# Patient Record
Sex: FEMALE | Race: WHITE | NOT HISPANIC OR LATINO | Employment: UNEMPLOYED | URBAN - METROPOLITAN AREA
[De-identification: names, ages, dates, MRNs, and addresses within clinical notes are randomized per-mention and may not be internally consistent; named-entity substitution may affect disease eponyms.]

---

## 2021-05-12 ENCOUNTER — PATIENT MESSAGE (OUTPATIENT)
Dept: RESEARCH | Facility: HOSPITAL | Age: 56
End: 2021-05-12

## 2024-01-31 ENCOUNTER — OFFICE VISIT (OUTPATIENT)
Dept: PLASTIC SURGERY | Facility: CLINIC | Age: 59
End: 2024-01-31
Payer: MEDICAID

## 2024-01-31 VITALS
HEIGHT: 60 IN | WEIGHT: 257.94 LBS | OXYGEN SATURATION: 97 % | SYSTOLIC BLOOD PRESSURE: 127 MMHG | DIASTOLIC BLOOD PRESSURE: 62 MMHG | BODY MASS INDEX: 50.64 KG/M2 | HEART RATE: 75 BPM

## 2024-01-31 DIAGNOSIS — Z98.82 S/P BREAST AUGMENTATION: ICD-10-CM

## 2024-01-31 DIAGNOSIS — C80.1 CANCER: ICD-10-CM

## 2024-01-31 PROCEDURE — 3074F SYST BP LT 130 MM HG: CPT | Mod: CPTII,,, | Performed by: SURGERY

## 2024-01-31 PROCEDURE — 3078F DIAST BP <80 MM HG: CPT | Mod: CPTII,,, | Performed by: SURGERY

## 2024-01-31 PROCEDURE — 3008F BODY MASS INDEX DOCD: CPT | Mod: CPTII,,, | Performed by: SURGERY

## 2024-01-31 PROCEDURE — 99204 OFFICE O/P NEW MOD 45 MIN: CPT | Mod: S$PBB,,, | Performed by: SURGERY

## 2024-01-31 PROCEDURE — 99999 PR PBB SHADOW E&M-EST. PATIENT-LVL IV: CPT | Mod: PBBFAC,,, | Performed by: SURGERY

## 2024-01-31 PROCEDURE — 1159F MED LIST DOCD IN RCRD: CPT | Mod: CPTII,,, | Performed by: SURGERY

## 2024-01-31 PROCEDURE — 99214 OFFICE O/P EST MOD 30 MIN: CPT | Mod: PBBFAC | Performed by: SURGERY
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
## 2024-02-21 DIAGNOSIS — C80.1 CANCER: ICD-10-CM

## 2024-02-21 DIAGNOSIS — Z98.82 S/P BREAST AUGMENTATION: Primary | ICD-10-CM

## 2024-02-22 ENCOUNTER — TELEPHONE (OUTPATIENT)
Dept: PREADMISSION TESTING | Facility: HOSPITAL | Age: 59
End: 2024-02-22
Payer: MEDICAID

## 2024-02-22 NOTE — ANESTHESIA PAT ROS NOTE
02/22/2024  Deanna Mcclain is a 58 y.o., female.      Pre-op Assessment    I have reviewed the NPO Status.   I have reviewed the Medications.     Review of Systems  Anesthesia Hx:   History of prior surgery of interest to airway management or planning:          Denies Family Hx of Anesthesia complications.   Personal Hx of Anesthesia complications          Slow To Awaken/Delayed Emergence (pt states happened once after a surgery years ago) and significant; extubation delayed; prolonged PACU stay          Social:  Former Smoker, No Alcohol Use       Hematology/Oncology:  Hematology Normal                       --  Cancer in past history:       Breast       chemotherapy and surgery   Oncology Comments: Pt h/o breast cancer s/p 2012 bilateral mastectomy and chemo     EENT/Dental:  EENT/Dental Normal           Cardiovascular:  Exercise tolerance: good   Hypertension, well controlled   Denies MI.  Denies CAD.       Denies Angina.          Functional Capacity good / => 4 METS                         Pulmonary:   COPD, mild    Denies Shortness of breath.  Sleep Apnea, CPAP                Hepatic/GI:     GERD, well controlled Denies Liver Disease.    Esophageal / Stomach Disorders (pt had gastric bypass in 2008 and reversal/creation in 2012 after complications)  Controlled by s/p Gastric Surgery.         Musculoskeletal:       Bone Disorders:    Osteoporosis        Neurological:  Neurology Normal Denies TIA.  Denies CVA.    Denies Seizures.                                Endocrine:  Denies Diabetes. Denies Hypothyroidism.        Morbid Obesity / BMI > 40  Psych:  Psychiatric History    Anxiety Disorder. Panic disorder                   Anesthesia Assessment: Preoperative EQUATION    Planned Procedure: Procedure(s) (LRB):  RECONSTRUCTION, BREAST IMPLANT BASED (Bilateral)  Requested Anesthesia Type:General  Surgeon:  Brad Zavala MD  Service: Plastics  Known or anticipated Date of Surgery:2/29/2024    Surgeon notes: reviewed    Electronic QUestionnaire Assessment completed via nurse interview with patient.        Triage considerations:     The patient has no apparent active cardiac condition (No unstable coronary Syndrome such as severe unstable angina or recent [<1 month] myocardial infarction, decompensated CHF, severe valvular   disease or significant arrhythmia)    Previous anesthesia records:Not available    Last PCP note:  no pcp  Subspecialty notes:  plastics    Other important co-morbidities: COPD, GERD, HTN, Morbid Obesity, DAQUAN, and osteoporosis, vit D deficiency, h/o adrenal benign neoplasm, panic disorder, breast cancer s/p bilateral mastectomy and chemo, gastric bypass 2008 and reversal/creation in 20121, h/o ADORE/BSO, delayed reconstruction after mastectomy       Tests already available:  No recent tests.             Instructions given. (See in Nurse's note)    Optimization:  Anesthesia Preop Clinic Assessment Indicated:                               --phone screen done        Plan:    Testing:  BMP, EKG, and Hematology Profile     Navigation: Tests Scheduled.             Results will be tracked by Preop Clinic.

## 2024-02-23 ENCOUNTER — TELEPHONE (OUTPATIENT)
Dept: PREADMISSION TESTING | Facility: HOSPITAL | Age: 59
End: 2024-02-23

## 2024-02-23 NOTE — TELEPHONE ENCOUNTER
----- Message from Mathew Robert RN sent at 2/23/2024  9:25 AM CST -----  Hey. This pt missed call from one of you and said she's trying to call back and having no luck. Can you call her back to schedule? Thanks, Mathew.

## 2024-02-28 ENCOUNTER — TELEPHONE (OUTPATIENT)
Dept: PLASTIC SURGERY | Facility: CLINIC | Age: 59
End: 2024-02-28
Payer: MEDICAID

## 2024-02-28 ENCOUNTER — ANESTHESIA EVENT (OUTPATIENT)
Dept: SURGERY | Facility: HOSPITAL | Age: 59
End: 2024-02-28
Payer: MEDICAID

## 2024-02-28 ENCOUNTER — HOSPITAL ENCOUNTER (OUTPATIENT)
Dept: CARDIOLOGY | Facility: CLINIC | Age: 59
Discharge: HOME | End: 2024-02-28
Payer: MEDICAID

## 2024-02-28 DIAGNOSIS — I10 HYPERTENSION, UNSPECIFIED TYPE: ICD-10-CM

## 2024-02-28 DIAGNOSIS — Z01.818 PREOP TESTING: ICD-10-CM

## 2024-02-28 LAB
OHS QRS DURATION: 90 MS
OHS QTC CALCULATION: 450 MS

## 2024-02-28 PROCEDURE — 93005 ELECTROCARDIOGRAM TRACING: CPT | Mod: PBBFAC | Performed by: INTERNAL MEDICINE

## 2024-02-28 PROCEDURE — 93010 ELECTROCARDIOGRAM REPORT: CPT | Mod: S$PBB,,, | Performed by: INTERNAL MEDICINE

## 2024-02-28 NOTE — TELEPHONE ENCOUNTER
Contacted pt to discuss procedure Thursday, Feb 29.  Explained to patient that she is the first case of the day, she should arrive on the 2nd floor, [facility] for 5:30a. Pt reminded not to eat or drink after midnight and to take a complete bath with Hibicleanse or Dial soap before arriving.     Pt voiced understanding.

## 2024-02-28 NOTE — ANESTHESIA PREPROCEDURE EVALUATION
"                                                                                                             02/28/2024  Deanna Mcclain is a 58 y.o., female w prior mastectomy 9 yrs ago, extreme obesity, HTN, DAQUAN and hx of gastric bypass    Pt reports that approximately 10 years ago she underwent emergency exlap for sbo and reports "dead stomach" was discovered at this time. She also states she had a large aspiration event during this surgery requiring icu admission and prolonged ventilator support.    Pre-op Assessment    I have reviewed the Patient Summary Reports.          Review of Systems  Anesthesia Hx:   History of prior surgery of interest to airway management or planning:             Cardiovascular:     Hypertension                                  Hypertension         Pulmonary:   COPD     Sleep Apnea    Chronic Obstructive Pulmonary Disease (COPD):           Obstructive Sleep Apnea (DAQUAN).           Hepatic/GI:        Prior sleeve          Psych:  Psychiatric History                  Physical Exam  General: Well nourished, Cooperative, Alert and Oriented    Airway:  Mallampati: II   Mouth Opening: Normal  TM Distance: Normal  Tongue: Normal  Neck ROM: Normal ROM    Dental:  Dentures, Partial Dentures    No top teeth, few bottom teeth    Anesthesia Plan  Type of Anesthesia, risks & benefits discussed:    Anesthesia Type: Gen ETT  Intra-op Monitoring Plan: Standard ASA Monitors  Post Op Pain Control Plan: multimodal analgesia  Induction:  IV  Airway Plan: Video  Informed Consent: Informed consent signed with the Patient and all parties understand the risks and agree with anesthesia plan.  All questions answered.   ASA Score: 3  Day of Surgery Review of History & Physical: H&P Update referred to the surgeon/provider.    Ready For Surgery From Anesthesia Perspective.     .      "

## 2024-02-29 ENCOUNTER — ANESTHESIA (OUTPATIENT)
Dept: SURGERY | Facility: HOSPITAL | Age: 59
End: 2024-02-29
Payer: MEDICAID

## 2024-02-29 ENCOUNTER — HOSPITAL ENCOUNTER (OUTPATIENT)
Facility: HOSPITAL | Age: 59
Discharge: HOME | End: 2024-02-29
Attending: SURGERY | Admitting: SURGERY
Payer: MEDICAID

## 2024-02-29 VITALS
HEIGHT: 60 IN | RESPIRATION RATE: 27 BRPM | HEART RATE: 58 BPM | DIASTOLIC BLOOD PRESSURE: 73 MMHG | TEMPERATURE: 98 F | OXYGEN SATURATION: 99 % | SYSTOLIC BLOOD PRESSURE: 111 MMHG | WEIGHT: 180 LBS | BODY MASS INDEX: 35.34 KG/M2

## 2024-02-29 DIAGNOSIS — Z98.82 BREAST IMPLANT IN SITU: ICD-10-CM

## 2024-02-29 DIAGNOSIS — I10 HYPERTENSION, UNSPECIFIED TYPE: ICD-10-CM

## 2024-02-29 DIAGNOSIS — Z01.818 PREOP TESTING: Primary | ICD-10-CM

## 2024-02-29 PROCEDURE — 71000016 HC POSTOP RECOV ADDL HR: Performed by: SURGERY

## 2024-02-29 PROCEDURE — 88307 TISSUE EXAM BY PATHOLOGIST: CPT | Mod: 59 | Performed by: STUDENT IN AN ORGANIZED HEALTH CARE EDUCATION/TRAINING PROGRAM

## 2024-02-29 PROCEDURE — 88305 TISSUE EXAM BY PATHOLOGIST: CPT | Mod: 59 | Performed by: STUDENT IN AN ORGANIZED HEALTH CARE EDUCATION/TRAINING PROGRAM

## 2024-02-29 PROCEDURE — 88305 TISSUE EXAM BY PATHOLOGIST: CPT | Mod: 26,,, | Performed by: STUDENT IN AN ORGANIZED HEALTH CARE EDUCATION/TRAINING PROGRAM

## 2024-02-29 PROCEDURE — 15777 ACELLULAR DERM MATRIX IMPLT: CPT | Mod: 50,,, | Performed by: SURGERY

## 2024-02-29 PROCEDURE — 71000044 HC DOSC ROUTINE RECOVERY FIRST HOUR: Performed by: SURGERY

## 2024-02-29 PROCEDURE — 36000706: Performed by: SURGERY

## 2024-02-29 PROCEDURE — 19342 INSJ/RPLCMT BRST IMPLT SEP D: CPT | Mod: 50,,, | Performed by: SURGERY

## 2024-02-29 PROCEDURE — 71000015 HC POSTOP RECOV 1ST HR: Performed by: SURGERY

## 2024-02-29 PROCEDURE — 37000009 HC ANESTHESIA EA ADD 15 MINS: Performed by: SURGERY

## 2024-02-29 PROCEDURE — 27201423 OPTIME MED/SURG SUP & DEVICES STERILE SUPPLY: Performed by: SURGERY

## 2024-02-29 PROCEDURE — 36000707: Performed by: SURGERY

## 2024-02-29 PROCEDURE — 25000003 PHARM REV CODE 250: Performed by: ANESTHESIOLOGY

## 2024-02-29 PROCEDURE — C1729 CATH, DRAINAGE: HCPCS | Performed by: SURGERY

## 2024-02-29 PROCEDURE — 63600175 PHARM REV CODE 636 W HCPCS: Performed by: ANESTHESIOLOGY

## 2024-02-29 PROCEDURE — D9220A PRA ANESTHESIA: Mod: ,,, | Performed by: ANESTHESIOLOGY

## 2024-02-29 PROCEDURE — 37000008 HC ANESTHESIA 1ST 15 MINUTES: Performed by: SURGERY

## 2024-02-29 PROCEDURE — C1789 PROSTHESIS, BREAST, IMP: HCPCS | Performed by: SURGERY

## 2024-02-29 DEVICE — IMPLANTABLE DEVICE: Type: IMPLANTABLE DEVICE | Site: BREAST | Status: FUNCTIONAL

## 2024-02-29 DEVICE — MATRIX ALLODERM THK PERF 16X20: Type: IMPLANTABLE DEVICE | Site: BREAST | Status: FUNCTIONAL

## 2024-02-29 RX ORDER — DEXMEDETOMIDINE HYDROCHLORIDE 100 UG/ML
INJECTION, SOLUTION INTRAVENOUS
Status: DISCONTINUED | OUTPATIENT
Start: 2024-02-29 | End: 2024-02-29

## 2024-02-29 RX ORDER — METHOCARBAMOL 500 MG/1
500 TABLET, FILM COATED ORAL 4 TIMES DAILY
Qty: 40 TABLET | Refills: 0 | Status: SHIPPED | OUTPATIENT
Start: 2024-02-29 | End: 2024-03-10

## 2024-02-29 RX ORDER — PROPOFOL 10 MG/ML
VIAL (ML) INTRAVENOUS
Status: DISCONTINUED | OUTPATIENT
Start: 2024-02-29 | End: 2024-02-29

## 2024-02-29 RX ORDER — OXYCODONE HYDROCHLORIDE 5 MG/1
5 TABLET ORAL EVERY 4 HOURS PRN
Qty: 18 TABLET | Refills: 0 | Status: SHIPPED | OUTPATIENT
Start: 2024-02-29

## 2024-02-29 RX ORDER — CEFAZOLIN SODIUM 1 G/3ML
INJECTION, POWDER, FOR SOLUTION INTRAMUSCULAR; INTRAVENOUS
Status: DISCONTINUED | OUTPATIENT
Start: 2024-02-29 | End: 2024-02-29

## 2024-02-29 RX ORDER — SUCCINYLCHOLINE CHLORIDE 20 MG/ML
INJECTION INTRAMUSCULAR; INTRAVENOUS
Status: DISCONTINUED | OUTPATIENT
Start: 2024-02-29 | End: 2024-02-29

## 2024-02-29 RX ORDER — MIDAZOLAM HYDROCHLORIDE 1 MG/ML
INJECTION INTRAMUSCULAR; INTRAVENOUS
Status: DISCONTINUED | OUTPATIENT
Start: 2024-02-29 | End: 2024-02-29

## 2024-02-29 RX ORDER — LIDOCAINE HYDROCHLORIDE 20 MG/ML
INJECTION INTRAVENOUS
Status: DISCONTINUED | OUTPATIENT
Start: 2024-02-29 | End: 2024-02-29

## 2024-02-29 RX ORDER — CLINDAMYCIN HYDROCHLORIDE 300 MG/1
300 CAPSULE ORAL EVERY 8 HOURS
Qty: 21 CAPSULE | Refills: 0 | Status: SHIPPED | OUTPATIENT
Start: 2024-02-29 | End: 2024-03-07

## 2024-02-29 RX ORDER — FENTANYL CITRATE 50 UG/ML
25 INJECTION, SOLUTION INTRAMUSCULAR; INTRAVENOUS EVERY 5 MIN PRN
Status: ACTIVE | OUTPATIENT
Start: 2024-02-29

## 2024-02-29 RX ORDER — OXYCODONE HYDROCHLORIDE 5 MG/1
5 TABLET ORAL
Status: DISPENSED | OUTPATIENT
Start: 2024-02-29

## 2024-02-29 RX ORDER — HALOPERIDOL 5 MG/ML
0.5 INJECTION INTRAMUSCULAR EVERY 10 MIN PRN
Status: ACTIVE | OUTPATIENT
Start: 2024-02-29

## 2024-02-29 RX ORDER — HYDROMORPHONE HYDROCHLORIDE 1 MG/ML
0.2 INJECTION, SOLUTION INTRAMUSCULAR; INTRAVENOUS; SUBCUTANEOUS EVERY 5 MIN PRN
Status: DISPENSED | OUTPATIENT
Start: 2024-02-29

## 2024-02-29 RX ORDER — FENTANYL CITRATE 50 UG/ML
INJECTION, SOLUTION INTRAMUSCULAR; INTRAVENOUS
Status: DISCONTINUED | OUTPATIENT
Start: 2024-02-29 | End: 2024-02-29

## 2024-02-29 RX ORDER — ROCURONIUM BROMIDE 10 MG/ML
INJECTION, SOLUTION INTRAVENOUS
Status: DISCONTINUED | OUTPATIENT
Start: 2024-02-29 | End: 2024-02-29

## 2024-02-29 RX ORDER — DEXAMETHASONE SODIUM PHOSPHATE 4 MG/ML
INJECTION, SOLUTION INTRA-ARTICULAR; INTRALESIONAL; INTRAMUSCULAR; INTRAVENOUS; SOFT TISSUE
Status: DISCONTINUED | OUTPATIENT
Start: 2024-02-29 | End: 2024-02-29

## 2024-02-29 RX ORDER — ONDANSETRON HYDROCHLORIDE 2 MG/ML
INJECTION, SOLUTION INTRAVENOUS
Status: DISCONTINUED | OUTPATIENT
Start: 2024-02-29 | End: 2024-02-29

## 2024-02-29 RX ORDER — PHENYLEPHRINE HYDROCHLORIDE 10 MG/ML
INJECTION INTRAVENOUS
Status: DISCONTINUED | OUTPATIENT
Start: 2024-02-29 | End: 2024-02-29

## 2024-02-29 RX ORDER — SODIUM CHLORIDE 9 MG/ML
INJECTION, SOLUTION INTRAVENOUS CONTINUOUS PRN
Status: DISCONTINUED | OUTPATIENT
Start: 2024-02-29 | End: 2024-02-29

## 2024-02-29 RX ORDER — ACETAMINOPHEN 10 MG/ML
INJECTION, SOLUTION INTRAVENOUS
Status: DISCONTINUED | OUTPATIENT
Start: 2024-02-29 | End: 2024-02-29

## 2024-02-29 RX ORDER — SODIUM CHLORIDE 9 MG/ML
INJECTION, SOLUTION INTRAVENOUS CONTINUOUS
Status: ACTIVE | OUTPATIENT
Start: 2024-02-29

## 2024-02-29 RX ADMIN — SUCCINYLCHOLINE CHLORIDE 140 MG: 20 INJECTION, SOLUTION INTRAMUSCULAR; INTRAVENOUS at 07:02

## 2024-02-29 RX ADMIN — DEXMEDETOMIDINE 8 MCG: 100 INJECTION, SOLUTION, CONCENTRATE INTRAVENOUS at 09:02

## 2024-02-29 RX ADMIN — GLYCOPYRROLATE 0.2 MG: 0.2 INJECTION, SOLUTION INTRAMUSCULAR; INTRAVENOUS at 08:02

## 2024-02-29 RX ADMIN — ROCURONIUM BROMIDE 20 MG: 10 INJECTION, SOLUTION INTRAVENOUS at 08:02

## 2024-02-29 RX ADMIN — SODIUM CHLORIDE: 0.9 INJECTION, SOLUTION INTRAVENOUS at 07:02

## 2024-02-29 RX ADMIN — PHENYLEPHRINE HYDROCHLORIDE 50 MCG: 10 INJECTION INTRAVENOUS at 07:02

## 2024-02-29 RX ADMIN — LIDOCAINE HYDROCHLORIDE 100 MG: 20 INJECTION INTRAVENOUS at 07:02

## 2024-02-29 RX ADMIN — ROCURONIUM BROMIDE 20 MG: 10 INJECTION, SOLUTION INTRAVENOUS at 09:02

## 2024-02-29 RX ADMIN — FENTANYL CITRATE 25 MCG: 50 INJECTION, SOLUTION INTRAMUSCULAR; INTRAVENOUS at 10:02

## 2024-02-29 RX ADMIN — PHENYLEPHRINE HYDROCHLORIDE 50 MCG: 10 INJECTION INTRAVENOUS at 08:02

## 2024-02-29 RX ADMIN — ROCURONIUM BROMIDE 45 MG: 10 INJECTION, SOLUTION INTRAVENOUS at 07:02

## 2024-02-29 RX ADMIN — CEFAZOLIN 2 G: 330 INJECTION, POWDER, FOR SOLUTION INTRAMUSCULAR; INTRAVENOUS at 07:02

## 2024-02-29 RX ADMIN — ROCURONIUM BROMIDE 5 MG: 10 INJECTION, SOLUTION INTRAVENOUS at 07:02

## 2024-02-29 RX ADMIN — DEXMEDETOMIDINE 12 MCG: 100 INJECTION, SOLUTION, CONCENTRATE INTRAVENOUS at 07:02

## 2024-02-29 RX ADMIN — PHENYLEPHRINE HYDROCHLORIDE 100 MCG: 10 INJECTION INTRAVENOUS at 11:02

## 2024-02-29 RX ADMIN — DEXAMETHASONE SODIUM PHOSPHATE 4 MG: 4 INJECTION, SOLUTION INTRAMUSCULAR; INTRAVENOUS at 07:02

## 2024-02-29 RX ADMIN — HYDROMORPHONE HYDROCHLORIDE 0.2 MG: 1 INJECTION, SOLUTION INTRAMUSCULAR; INTRAVENOUS; SUBCUTANEOUS at 12:02

## 2024-02-29 RX ADMIN — ROCURONIUM BROMIDE 10 MG: 10 INJECTION, SOLUTION INTRAVENOUS at 09:02

## 2024-02-29 RX ADMIN — FENTANYL CITRATE 100 MCG: 50 INJECTION, SOLUTION INTRAMUSCULAR; INTRAVENOUS at 07:02

## 2024-02-29 RX ADMIN — SUGAMMADEX 200 MG: 100 INJECTION, SOLUTION INTRAVENOUS at 11:02

## 2024-02-29 RX ADMIN — PROPOFOL 200 MG: 10 INJECTION, EMULSION INTRAVENOUS at 07:02

## 2024-02-29 RX ADMIN — OXYCODONE 5 MG: 5 TABLET ORAL at 12:02

## 2024-02-29 RX ADMIN — PHENYLEPHRINE HYDROCHLORIDE 50 MCG: 10 INJECTION INTRAVENOUS at 09:02

## 2024-02-29 RX ADMIN — MIDAZOLAM HYDROCHLORIDE 2 MG: 1 INJECTION, SOLUTION INTRAMUSCULAR; INTRAVENOUS at 07:02

## 2024-02-29 RX ADMIN — SODIUM CHLORIDE, SODIUM GLUCONATE, SODIUM ACETATE, POTASSIUM CHLORIDE, MAGNESIUM CHLORIDE, SODIUM PHOSPHATE, DIBASIC, AND POTASSIUM PHOSPHATE: .53; .5; .37; .037; .03; .012; .00082 INJECTION, SOLUTION INTRAVENOUS at 08:02

## 2024-02-29 RX ADMIN — FENTANYL CITRATE 25 MCG: 50 INJECTION, SOLUTION INTRAMUSCULAR; INTRAVENOUS at 09:02

## 2024-02-29 RX ADMIN — ACETAMINOPHEN 1000 MG: 10 INJECTION, SOLUTION INTRAVENOUS at 07:02

## 2024-02-29 RX ADMIN — FENTANYL CITRATE 50 MCG: 50 INJECTION, SOLUTION INTRAMUSCULAR; INTRAVENOUS at 11:02

## 2024-02-29 NOTE — DISCHARGE INSTRUCTIONS
Plastic Surgery Discharge Instructions    Wound Care  1. Shower daily beginning 2 days after surgery  2. Okay to let warm soapy water run over the wound at that time  3. Do not submerge wounds in the bath  4. Leave any skin glue or mesh tape in place    Drain Care  If you have drains, strip tubing and record output when drain bulb becomes half full, or at least twice daily  Record output for each drain and bring to our follow up appointment    Diet  Regular Diet    Activity  Light activity only - no lifting greater than 10 lbs  Avoid strenuous activity that would cause you to get hot or sweaty    Medications  You have been given a prescription for antibiotics, narcotic pain medication, and muscle relaxer  Unless otherwise contraindicated by your doctor, take 2 extra strength Tylenol and 600mg of ibuprofen every 8 hours  Please take medications as prescribed     What to call for:  1. Sustained fever > 101.0  2. Changes in color, sensation, temperature or pain at surgical site  3. Redness and/or drainage from the surgical site  4. Any reaction to prescribed medications  5. Continuous bloody drainage from surgical drains  6. Wound vac malfunction  7. Questions related to the procedure    Follow-up  1. Call with any questions or concerns.  2. After hours call [phone] - ask to speak with the Plastic Surgery fellow on call

## 2024-02-29 NOTE — BRIEF OP NOTE
[facility] - Surgery ([facility])  Brief Operative Note    Surgery Date: 2/29/2024     Surgeon(s) and Role:     * Osito Freire MD - Primary     * Hellen Rodgers MD    Assisting Surgeon: None    Pre-op Diagnosis:  S/P breast augmentation [Z98.82]    Post-op Diagnosis:  Post-Op Diagnosis Codes:     * S/P breast augmentation [Z98.82]    Procedure(s) (LRB):  RECONSTRUCTION, BREAST IMPLANT BASED (Bilateral)    Anesthesia: General    Operative Findings: Bilateral breast implants placed. See op note for full details.    Estimated Blood Loss: * No values recorded between 2/29/2024  8:06 AM and 2/29/2024 11:35 AM *         Specimens:   Specimen (24h ago, onward)       Start     Ordered    02/29/24 1043  Specimen to Pathology, Surgery ENT  Once        Comments: Pre-op Diagnosis: S/P breast augmentation [Z98.82]Procedure(s):RECONSTRUCTION, BREAST IMPLANT BASED Number of specimens: 2Name of specimens: 1. RIGHT AXILLIA--PERM2. LEFT AXILLA --PERM     References:    Click here for ordering Quick Tip   Question Answer Comment   Procedure Type: ENT    Which provider would you like to cc? OSITO FREIRE    Release to patient Immediate        02/29/24 1042                      Discharge Note    OUTCOME: Patient tolerated treatment/procedure well without complication and is now ready for discharge.    DISPOSITION: Home or Self Care    FINAL DIAGNOSIS:  <principal problem not specified>    FOLLOWUP: In clinic    DISCHARGE INSTRUCTIONS:    Discharge Procedure Orders   Basic Metabolic Panel   Standing Status: Future Number of Occurrences: 1 Standing Exp. Date: 04/22/25     CBC Without Differential   Standing Status: Future Number of Occurrences: 1 Standing Exp. Date: 04/22/25     Diet Adult Regular     Notify your health care provider if you experience any of the following:  temperature >100.4     Notify your health care provider if you experience any of the following:  persistent nausea and vomiting or diarrhea      Notify your health care provider if you experience any of the following:  severe uncontrolled pain     Notify your health care provider if you experience any of the following:  redness, tenderness, or signs of infection (pain, swelling, redness, odor or green/yellow discharge around incision site)     Notify your health care provider if you experience any of the following:  difficulty breathing or increased cough     Remove dressing in 48 hours     EKG 12-lead   Standing Status: Future Number of Occurrences: 1 Standing Exp. Date: 02/22/25     Weight bearing restrictions (specify):   Order Comments: Do not lift greater than 10 lbs for 4 weeks

## 2024-02-29 NOTE — ANESTHESIA PROCEDURE NOTES
Intubation    Date/Time: 2/29/2024 7:45 AM    Performed by: Keiko Gonzalez MD  Authorized by: Keiko Gonzalez MD    Intubation:     Induction:  Rapid sequence induction    Intubated:  Postinduction    Mask Ventilation:  Not attempted    Attempts:  1    Attempted By:  Staff anesthesiologist    Method of Intubation:  Video laryngoscopy    Blade:  Herring 3    Laryngeal View Grade: Grade I - full view of cords      Difficult Airway Encountered?: No      Complications:  None    Airway Device:  Oral endotracheal tube    Airway Device Size:  7.0    Style/Cuff Inflation:  Cuffed (inflated to minimal occlusive pressure)    Tube secured:  21    Secured at:  The teeth    Placement Verified By:  Capnometry    Complicating Factors:  None    Findings Post-Intubation:  BS equal bilateral and atraumatic/condition of teeth unchanged

## 2024-02-29 NOTE — ANESTHESIA POSTPROCEDURE EVALUATION
Anesthesia Post Evaluation    Patient: Deanna Mcclain    Procedure(s) Performed: Procedure(s) (LRB):  RECONSTRUCTION, BREAST IMPLANT BASED (Bilateral)    Final Anesthesia Type: general      Patient location during evaluation: PACU  Patient participation: Yes- Able to Participate  Level of consciousness: awake and alert and oriented  Post-procedure vital signs: reviewed and stable  Pain management: adequate  Airway patency: patent    PONV status at discharge: No PONV  Anesthetic complications: no      Cardiovascular status: blood pressure returned to baseline  Respiratory status: unassisted and spontaneous ventilation  Hydration status: euvolemic  Follow-up not needed.              Vitals Value Taken Time   /87 02/29/24 1302   Temp 36.8 °C (98.2 °F) 02/29/24 1145   Pulse 58 02/29/24 1302   Resp 37 02/29/24 1302   SpO2 99 % 02/29/24 1302   Vitals shown include unvalidated device data.      No case tracking events are documented in the log.      Pain/Lowell Score: Pain Rating Prior to Med Admin: 9 (2/29/2024 12:25 PM)  Lowell Score: 9 (2/29/2024 12:30 PM)

## 2024-02-29 NOTE — TRANSFER OF CARE
Anesthesia Transfer of Care Note    Patient: Deanna Mcclain    Procedure(s) Performed: Procedure(s) (LRB):  RECONSTRUCTION, BREAST IMPLANT BASED (Bilateral)    Patient location: PACU    Anesthesia Type: general    Transport from OR: Transported from OR on 2-3 L/min O2 by NC with adequate spontaneous ventilation    Post pain: adequate analgesia    Post assessment: no apparent anesthetic complications    Post vital signs: stable    Level of consciousness: awake    Nausea/Vomiting: no nausea/vomiting    Complications: none    Transfer of care protocol was followed      Last vitals: Visit Vitals  /62 (BP Location: Left arm, Patient Position: Sitting)   Pulse 67   Temp 36.3 °C (97.3 °F) (Oral)   Resp 16   Ht 5' (1.524 m)   Wt 81.6 kg (180 lb)   SpO2 97%   Breastfeeding No   BMI 35.15 kg/m²

## 2024-02-29 NOTE — H&P
Plastic and Reconstructive Surgery   H&P    Date:   2024    History of Present Illness:    58 y.o. female who presents for bilateral tissue expander vs implant placement, as well as excision of lower lip venous malformation.     She has had chemotherapy but no radiation.    There is no change in H&P since last office visit. Will proceed with planned procedure.      Past Medical History:    has a past medical history of Adrenal benign neoplasm, Breast carcinoma, COPD (chronic obstructive pulmonary disease), HTN (hypertension), benign, DAQUAN on CPAP, Osteoporosis, Panic disorder, and Vitamin D deficiency.    Past Surgical History:    has a past surgical history that includes Gastric bypass (); Gastric Bypass Reversal (); Breast surgery (Bilateral, 2012); Cholecystectomy (); Total abdominal hysterectomy w/ bilateral salpingoophorectomy (); and Hysterectomy.    Social History:  Social History     Tobacco Use    Smoking status: Former     Current packs/day: 0.00     Average packs/day: 1 pack/day for 30.0 years (30.0 ttl pk-yrs)     Types: Cigarettes     Start date: 11/3/1979     Quit date: 11/3/2009     Years since quittin.3    Smokeless tobacco: Not on file   Substance Use Topics    Alcohol use: Not Currently     Social History     Substance and Sexual Activity   Drug Use Not on file       Family History:  Family History   Problem Relation Age of Onset    Ovarian cancer Mother     Uterine cancer Mother     COPD Brother     Breast cancer Maternal Grandmother     COPD Maternal Grandfather     Breast cancer Paternal Grandmother     Lung cancer Paternal Grandfather     Breast cancer Maternal Aunt        Allergies:  Review of patient's allergies indicates:  No Known Allergies    Home Medications:  Scheduled Meds:  Continuous Infusions:   sodium chloride 0.9%       PRN Meds:.ceFAZolin (Ancef) IV (PEDS and ADULTS)      Review of Systems:  Negative except for what is noted in HPI    Physical  "Exam:  VITAL SIGNS:   Vitals:    24 0610   BP: 130/62   BP Location: Left arm   Patient Position: Sitting   Pulse: 67   Resp: 16   Temp: 97.3 °F (36.3 °C)   TempSrc: Oral   SpO2: 97%   Weight: 81.6 kg (180 lb)   Height: 5' (1.524 m)     TMAX: Temp (24hrs), Av.3 °F (36.3 °C), Min:97.3 °F (36.3 °C), Max:97.3 °F (36.3 °C)      General: Alert; No acute distress  Cardiovascular: Regular rate   Respiratory: Normal respiratory effort. Chest rise symmetric.   Abdomen: Soft, nontender, nondistended  Extremity: Moves all extremities equally.  Neurologic: No focal deficit. Speech normal     Aquicred absence of bilateral breasts  Lower lip venous malformation present    Diagnostic Data:  No results found for this or any previous visit (from the past 336 hour(s)).  Recent Results (from the past 336 hour(s))   Basic Metabolic Panel    Collection Time: 24 10:45 AM   Result Value Ref Range    Sodium 142 136 - 145 mmol/L    Potassium 4.3 3.5 - 5.1 mmol/L    Chloride 107 95 - 110 mmol/L    CO2 28 23 - 29 mmol/L    BUN 15 6 - 20 mg/dL    Creatinine 1.3 0.5 - 1.4 mg/dL    Calcium 9.5 8.7 - 10.5 mg/dL    Anion Gap 7 (L) 8 - 16 mmol/L     No results found for: "ALBUMIN"  No results found for: "CRP"  No results found for: "INR", "PROTIME"  No results found for: "PTT"    Microbiology Results (last 7 days)       ** No results found for the last 168 hours. **            Assessment:  58 y.o.female with h/o breast cancer, s/p bilateral mastectomy     Also has a lower lip venous malformation 2/2 trauma    Plan:  Plan for bilateral tissues expander vs implant placement, excision of traumatic lip venous malformation  in OR  Consent obtained    Discussed with patient and/or family the risks and benefits of surgical intervention.  Conservative measures have been exhausted and patient would like to proceed with surgery.      We have discussed risks, which include but are not limited to blood clots in the legs that can travel to the " lungs (pulmonary embolism). Pulmonary embolism can cause shortness of breath, chest pain, and even shock. Other risks include urinary tract infection, nausea and vomiting (usually related to pain medication), chronic pain, bleeding, nerve damage, blood vessel injury, scarring and infection, which can require re-operation. Furthermore, the risks of anesthesia include potential heart, lung, kidney, and liver damage.  Informed consent was obtained.  The patient understands and would like to proceed with surgery in the near future.          Will Covarrubias MD  Plastic Surgery Fellow

## 2024-03-01 NOTE — OP NOTE
Date of surgery 02/29/2024   Preoperative diagnosis history of breast cancer  Postoperative diagnosis is same  Procedure performed  1. Delayed reconstruction of bilateral breasts using implants.  2. Placement of 2 20 x 16 cm pieces of AlloDerm in the right breast  3. Placement of 220 x 16 pieces of AlloDerm left breast  Surgeon Lucas  Anesthesia general  Complications none  Blood loss 100 cc   Drains x2     The patient was placed in the supine position after adequate general anesthesia was prepped and draped in a normal sterile fashion.  The previous mastectomy incisions were then opened.  They were extended slightly medially and laterally.  The mastectomy plane was then entered above the pectoralis major muscle.  Thus the entire mastectomy pockets were reopened.  Should be noted that the contour of fat on the left breast was very symmetrical.  The contour of the breasts fat on the right side was not.  This would lead to future fat grafting in these areas.  Next, temporary sizers were then placed and was determined that a proximally 580 cc smooth silicone implant would be used.  Chest wall was then re-prepped redraped pockets were irrigated with Vashe solution.  2580 cc smooth silicone implants were opened on the back table.  Four pieces of 16 cm x 20 cm AlloDerm was opened on the back table.  Each piece of AlloDerm was soaked rinsed and soaked again.  The AlloDerm was sutured around each implant 2 pieces for each implant.  Excess was trimmed.  The AlloDerm surrounded implants were then placed in the prepectoral position and secured using interrupted 2-0 Vicryl sutures.  The left breast due to the symmetrical fat was noted to be very smooth.  The right breast had some thinned areas of the mastectomy flaps which showed some contour irregularities laterally which can be fat grafted at a later time.  Capsules were closed using running 2-0 Vicryl and the skin using interrupted 3-0 Monocryl followed by running 4-0  Monocryl subcuticular suture.  A drain was placed in each breast.

## 2024-03-06 ENCOUNTER — OFFICE VISIT (OUTPATIENT)
Dept: PLASTIC SURGERY | Facility: CLINIC | Age: 59
End: 2024-03-06
Payer: MEDICAID

## 2024-03-06 VITALS
OXYGEN SATURATION: 99 % | HEART RATE: 74 BPM | SYSTOLIC BLOOD PRESSURE: 129 MMHG | BODY MASS INDEX: 35.15 KG/M2 | HEIGHT: 60 IN | DIASTOLIC BLOOD PRESSURE: 83 MMHG

## 2024-03-06 DIAGNOSIS — Z09 SURGERY FOLLOW-UP EXAMINATION: Primary | ICD-10-CM

## 2024-03-06 LAB
FINAL PATHOLOGIC DIAGNOSIS: NORMAL
GROSS: NORMAL
Lab: NORMAL
MICROSCOPIC EXAM: NORMAL

## 2024-03-06 PROCEDURE — 99024 POSTOP FOLLOW-UP VISIT: CPT | Mod: ,,, | Performed by: SURGERY

## 2024-03-06 PROCEDURE — 3074F SYST BP LT 130 MM HG: CPT | Mod: CPTII,,, | Performed by: SURGERY

## 2024-03-06 PROCEDURE — 3079F DIAST BP 80-89 MM HG: CPT | Mod: CPTII,,, | Performed by: SURGERY

## 2024-03-06 PROCEDURE — 1159F MED LIST DOCD IN RCRD: CPT | Mod: CPTII,,, | Performed by: SURGERY

## 2024-03-06 PROCEDURE — 99214 OFFICE O/P EST MOD 30 MIN: CPT | Mod: PBBFAC | Performed by: SURGERY

## 2024-03-06 PROCEDURE — 99999 PR PBB SHADOW E&M-EST. PATIENT-LVL IV: CPT | Mod: PBBFAC,,, | Performed by: SURGERY
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
## 2024-03-07 ENCOUNTER — TELEPHONE (OUTPATIENT)
Dept: PLASTIC SURGERY | Facility: CLINIC | Age: 59
End: 2024-03-07
Payer: MEDICAID

## 2024-03-07 NOTE — TELEPHONE ENCOUNTER
"Spoke w/pt and informed that clinic days are Wednesday. Pt would like to keep as is.    ----- Message from Penny Rosado RN sent at 3/6/2024  4:26 PM CST -----  Regarding: FW: pt advice  Contact: [phone]    ----- Message -----  From: Shannan Starr  Sent: 3/6/2024   2:31 PM CST  To: [facility] Staff  Subject: pt advice                                        Name Of Caller: self     Contact Preference?:  [phone]     What is the nature of the call?: would like to know if she can change her appt to Friday the 15th due to it's a long drive and she would rather the weekend so she can stay down     Additional Notes:    "Thank you for all that you do for our patients"          "

## 2024-03-13 ENCOUNTER — OFFICE VISIT (OUTPATIENT)
Dept: PLASTIC SURGERY | Facility: CLINIC | Age: 59
End: 2024-03-13
Payer: MEDICAID

## 2024-03-13 ENCOUNTER — PATIENT MESSAGE (OUTPATIENT)
Dept: PLASTIC SURGERY | Facility: CLINIC | Age: 59
End: 2024-03-13

## 2024-03-13 VITALS
HEART RATE: 74 BPM | HEIGHT: 60 IN | BODY MASS INDEX: 35.34 KG/M2 | SYSTOLIC BLOOD PRESSURE: 130 MMHG | WEIGHT: 180 LBS | DIASTOLIC BLOOD PRESSURE: 65 MMHG

## 2024-03-13 DIAGNOSIS — Z09 SURGERY FOLLOW-UP EXAMINATION: Primary | ICD-10-CM

## 2024-03-13 PROCEDURE — 99213 OFFICE O/P EST LOW 20 MIN: CPT | Mod: PBBFAC | Performed by: SURGERY

## 2024-03-13 PROCEDURE — 3078F DIAST BP <80 MM HG: CPT | Mod: CPTII,,, | Performed by: SURGERY

## 2024-03-13 PROCEDURE — 3075F SYST BP GE 130 - 139MM HG: CPT | Mod: CPTII,,, | Performed by: SURGERY

## 2024-03-13 PROCEDURE — 99024 POSTOP FOLLOW-UP VISIT: CPT | Mod: ,,, | Performed by: SURGERY

## 2024-03-13 PROCEDURE — 99999 PR PBB SHADOW E&M-EST. PATIENT-LVL III: CPT | Mod: PBBFAC,,, | Performed by: SURGERY

## 2024-03-13 PROCEDURE — 1159F MED LIST DOCD IN RCRD: CPT | Mod: CPTII,,, | Performed by: SURGERY

## 2024-03-13 NOTE — PROGRESS NOTES
Patient presents Plastic surgery Clinic after undergoing a delayed bilateral breast reconstruction using direct implant with an AlloDerm sling.  She is done very very well.  All her incisions are well healed.  She will need to have some fat grafting in the future.  Otherwise she is doing well she will return to clinic in proximally 6 weeks.

## 2024-03-27 ENCOUNTER — OFFICE VISIT (OUTPATIENT)
Dept: PLASTIC SURGERY | Facility: CLINIC | Age: 59
End: 2024-03-27
Payer: MEDICAID

## 2024-03-27 VITALS
SYSTOLIC BLOOD PRESSURE: 135 MMHG | DIASTOLIC BLOOD PRESSURE: 80 MMHG | HEIGHT: 60 IN | OXYGEN SATURATION: 99 % | HEART RATE: 94 BPM | BODY MASS INDEX: 35.15 KG/M2

## 2024-03-27 DIAGNOSIS — Z09 SURGERY FOLLOW-UP EXAMINATION: Primary | ICD-10-CM

## 2024-03-27 PROCEDURE — 99999 PR PBB SHADOW E&M-EST. PATIENT-LVL III: CPT | Mod: PBBFAC,,, | Performed by: SURGERY

## 2024-03-27 PROCEDURE — 3079F DIAST BP 80-89 MM HG: CPT | Mod: CPTII,,, | Performed by: SURGERY

## 2024-03-27 PROCEDURE — 1159F MED LIST DOCD IN RCRD: CPT | Mod: CPTII,,, | Performed by: SURGERY

## 2024-03-27 PROCEDURE — 4010F ACE/ARB THERAPY RXD/TAKEN: CPT | Mod: CPTII,,, | Performed by: SURGERY

## 2024-03-27 PROCEDURE — 3075F SYST BP GE 130 - 139MM HG: CPT | Mod: CPTII,,, | Performed by: SURGERY

## 2024-03-27 PROCEDURE — 99213 OFFICE O/P EST LOW 20 MIN: CPT | Mod: PBBFAC | Performed by: SURGERY

## 2024-03-27 PROCEDURE — 99024 POSTOP FOLLOW-UP VISIT: CPT | Mod: ,,, | Performed by: SURGERY

## 2024-03-27 RX ORDER — CLINDAMYCIN HYDROCHLORIDE 300 MG/1
300 CAPSULE ORAL EVERY 6 HOURS
Qty: 40 CAPSULE | Refills: 0 | Status: SHIPPED | OUTPATIENT
Start: 2024-03-27 | End: 2024-04-06

## 2024-03-27 NOTE — PROGRESS NOTES
Plastic Surgery Clinic Postop Visit    Subjective:      Deanna Mcclain is a 58 y.o. year old female who presents to the Plastic Surgery Clinic on 03/27/2024 for follow up visit status post delayed bilateral breast reconstruction using direct implant 650cc Natrelle with an AlloDerm sling. She has done very well. Incisions well healed.  It was discussed in her last postoperative visit about potential fat grafting in the near future.  She is still interested in this today.  She was having a little soreness over the lateral incisions.  She has had a small amount of drainage on her right bra.   Denies fever, chills, nausea, vomiting, or other systemic signs of infection.        ROS:  Negative unless otherwise stated above in HPI    Objective:     Physical Exam:  Vitals:    03/27/24 1445   BP: 135/80   Pulse: 94       WD WN NAD  VSS  Normal resp effort  Bilateral, lateral breast incisions well healed with skin staples place, no signs infection some minor surrounding erythema  There is a pinpoint wound over the central incision, drainage serous fluid. Incision otherwise c/d/i      Assessment:       No diagnosis found.    Plan:   58 y.o. female status post delayed bilateral breast reconstruction using direct implant with an AlloDerm sling.  - Doing well but has small opening draining clear fluid  - Pt was seen and evaluated by myself and Dr. Brad Zavala.   - Antibiotics x 7 days    After verbal informed consent, patient was prepped and draped in the usual sterile fashion. A #15 blade scalpel was used to excise the wound. 2-0 Nylon sutures were placed in an interrupted fashion to reapproximate the wound edges. Sterile dressings were applied. Patient tolerated the procedure well without complications.       All questions were answered. The patient was advised to call the clinic with any questions or concerns prior to their next visit.     Will Covarrubias MD- Fellow  Plastic and Reconstructive Surgery  [phone]  [phone]

## 2024-03-28 ENCOUNTER — PATIENT MESSAGE (OUTPATIENT)
Dept: PLASTIC SURGERY | Facility: CLINIC | Age: 59
End: 2024-03-28
Payer: MEDICAID

## 2024-04-02 ENCOUNTER — OFFICE VISIT (OUTPATIENT)
Dept: PLASTIC SURGERY | Facility: CLINIC | Age: 59
End: 2024-04-02
Payer: MEDICAID

## 2024-04-02 VITALS
WEIGHT: 179.88 LBS | BODY MASS INDEX: 35.31 KG/M2 | SYSTOLIC BLOOD PRESSURE: 135 MMHG | HEIGHT: 60 IN | DIASTOLIC BLOOD PRESSURE: 80 MMHG | HEART RATE: 94 BPM

## 2024-04-02 DIAGNOSIS — Z09 SURGERY FOLLOW-UP EXAMINATION: Primary | ICD-10-CM

## 2024-04-02 PROCEDURE — 4010F ACE/ARB THERAPY RXD/TAKEN: CPT | Mod: CPTII,,, | Performed by: SURGERY

## 2024-04-02 PROCEDURE — 99213 OFFICE O/P EST LOW 20 MIN: CPT | Mod: PBBFAC | Performed by: SURGERY

## 2024-04-02 PROCEDURE — 99024 POSTOP FOLLOW-UP VISIT: CPT | Mod: ,,, | Performed by: SURGERY

## 2024-04-02 PROCEDURE — 1159F MED LIST DOCD IN RCRD: CPT | Mod: CPTII,,, | Performed by: SURGERY

## 2024-04-02 PROCEDURE — 3079F DIAST BP 80-89 MM HG: CPT | Mod: CPTII,,, | Performed by: SURGERY

## 2024-04-02 PROCEDURE — 99999 PR PBB SHADOW E&M-EST. PATIENT-LVL III: CPT | Mod: PBBFAC,,, | Performed by: SURGERY

## 2024-04-02 PROCEDURE — 3075F SYST BP GE 130 - 139MM HG: CPT | Mod: CPTII,,, | Performed by: SURGERY

## 2024-04-02 NOTE — PROGRESS NOTES
Patient presents to the Plastic surgery Clinic after having bilateral breast reconstruction.  Last week she had a small opening in her incision this was reexcised and closed.  Today everything looks good except for 1 punctate 3 x 3 cm discrete area of redness in the right breast.  Patient has this has been there for awhile.  There is no fluid or any evidence of any other erythema.  Patient will continue taking her antibiotics.  We will see her again next week.

## 2024-04-07 ENCOUNTER — TELEPHONE (OUTPATIENT)
Dept: SURGERY | Facility: HOSPITAL | Age: 59
End: 2024-04-07
Payer: MEDICAID

## 2024-04-07 NOTE — TELEPHONE ENCOUNTER
Spoke with patient regarding increased drainage from breast incision. She is s/p breast reconstruction with Dr. Zavala in Feb 2024. She had previously had issues with drainage from the incision and most recently had a clinic visit 4/2. At that time, a portion of the incision was excised and re-closed. Since then she has done well, until yesterday when she noticed her drainage significantly increased again. Her description of the drainage is consistent with serosanguinous fluid. She denies purulence or odor from the wound. She denies fever or chills. She has been replacing the dressing to the incision frequently. She no longer has drains in place. She continues on PO antibiotic therapy.    Consulted with plastics fellow Dr. Covarrubias, discussed that patient can follow up with Dr. Zavala in clinic tomorrow 4/8/24 for evaluation. Advised patient to continue replacing the dressing as needed and we will arrange for her to have an appointment in clinic in the morning.    Hellen Rodgers MD  [facility]  General Surgery PGY-1

## 2024-04-08 ENCOUNTER — OFFICE VISIT (OUTPATIENT)
Dept: PLASTIC SURGERY | Facility: CLINIC | Age: 59
End: 2024-04-08
Payer: MEDICAID

## 2024-04-08 VITALS — WEIGHT: 179.88 LBS | HEIGHT: 60 IN | BODY MASS INDEX: 35.31 KG/M2

## 2024-04-08 DIAGNOSIS — Z09 SURGERY FOLLOW-UP EXAMINATION: Primary | ICD-10-CM

## 2024-04-08 PROCEDURE — 1159F MED LIST DOCD IN RCRD: CPT | Mod: CPTII,,, | Performed by: SURGERY

## 2024-04-08 PROCEDURE — 4010F ACE/ARB THERAPY RXD/TAKEN: CPT | Mod: CPTII,,, | Performed by: SURGERY

## 2024-04-08 PROCEDURE — 99999 PR PBB SHADOW E&M-EST. PATIENT-LVL III: CPT | Mod: PBBFAC,,, | Performed by: SURGERY

## 2024-04-08 PROCEDURE — 99213 OFFICE O/P EST LOW 20 MIN: CPT | Mod: PBBFAC | Performed by: SURGERY

## 2024-04-08 PROCEDURE — 99024 POSTOP FOLLOW-UP VISIT: CPT | Mod: ,,, | Performed by: SURGERY

## 2024-04-08 RX ORDER — CLINDAMYCIN HYDROCHLORIDE 300 MG/1
300 CAPSULE ORAL EVERY 8 HOURS
Qty: 21 CAPSULE | Refills: 0 | Status: SHIPPED | OUTPATIENT
Start: 2024-04-08 | End: 2024-04-15

## 2024-04-08 NOTE — PROGRESS NOTES
Patient presents Plastic surgery after bilaterally breast reconstruction using implants.  She is done well.  She did have a small pinhole which was draining some serous fluid about 2 weeks ago.  At that time we prepped draped in excise that skin and reclosed it.  She gives history of having drained some more serous fluid over the weekend.  She presents today for evaluation.  Today I see no evidence of any drainage.  She does not have any cellulitis.  The sutures are intact.  We did put skin glue over the area.  She will return in 1 week for re-evaluation.  She knows to call if there is any problems.

## 2024-04-15 ENCOUNTER — OFFICE VISIT (OUTPATIENT)
Dept: PLASTIC SURGERY | Facility: CLINIC | Age: 59
End: 2024-04-15
Payer: MEDICAID

## 2024-04-15 VITALS
WEIGHT: 179.88 LBS | HEIGHT: 60 IN | DIASTOLIC BLOOD PRESSURE: 80 MMHG | HEART RATE: 94 BPM | SYSTOLIC BLOOD PRESSURE: 135 MMHG | BODY MASS INDEX: 35.31 KG/M2

## 2024-04-15 DIAGNOSIS — C80.1 CANCER: Primary | ICD-10-CM

## 2024-04-15 DIAGNOSIS — Z98.82 S/P BREAST AUGMENTATION: ICD-10-CM

## 2024-04-15 DIAGNOSIS — Z09 SURGERY FOLLOW-UP EXAMINATION: Primary | ICD-10-CM

## 2024-04-15 PROCEDURE — 3075F SYST BP GE 130 - 139MM HG: CPT | Mod: CPTII,,, | Performed by: SURGERY

## 2024-04-15 PROCEDURE — 99213 OFFICE O/P EST LOW 20 MIN: CPT | Mod: PBBFAC | Performed by: SURGERY

## 2024-04-15 PROCEDURE — 99999 PR PBB SHADOW E&M-EST. PATIENT-LVL III: CPT | Mod: PBBFAC,,, | Performed by: SURGERY

## 2024-04-15 PROCEDURE — 1159F MED LIST DOCD IN RCRD: CPT | Mod: CPTII,,, | Performed by: SURGERY

## 2024-04-15 PROCEDURE — 4010F ACE/ARB THERAPY RXD/TAKEN: CPT | Mod: CPTII,,, | Performed by: SURGERY

## 2024-04-15 PROCEDURE — 99024 POSTOP FOLLOW-UP VISIT: CPT | Mod: ,,, | Performed by: SURGERY

## 2024-04-15 PROCEDURE — 3079F DIAST BP 80-89 MM HG: CPT | Mod: CPTII,,, | Performed by: SURGERY

## 2024-04-16 NOTE — PROGRESS NOTES
Patient presents to Plastic surgery Clinic after having delayed bilateral breast reconstruction with implants and AlloDerm.  We had a problem with a little bit of a seroma.  The incision was reexcised proximally 4 weeks ago.  She did have some Dermabond placed on this as well.  She reports some spotting.  All of the sutures were removed.  I do not see any drainage or leakage.  There has no sign of cellulitis.  She had 1/4 sized area of redness on the breasts which is now almost resolved.  It has been stable.  I do not feel any seroma or fluid collection.  If she continues to drain the discussed with her that it probably would be best if I took her back to surgery and washed this out and put another drain.

## 2024-04-24 ENCOUNTER — PATIENT MESSAGE (OUTPATIENT)
Dept: PLASTIC SURGERY | Facility: CLINIC | Age: 59
End: 2024-04-24
Payer: MEDICAID

## 2024-04-25 ENCOUNTER — OFFICE VISIT (OUTPATIENT)
Dept: PLASTIC SURGERY | Facility: CLINIC | Age: 59
End: 2024-04-25
Payer: MEDICAID

## 2024-04-25 VITALS
SYSTOLIC BLOOD PRESSURE: 135 MMHG | HEART RATE: 94 BPM | WEIGHT: 179.88 LBS | HEIGHT: 60 IN | BODY MASS INDEX: 35.31 KG/M2 | DIASTOLIC BLOOD PRESSURE: 80 MMHG

## 2024-04-25 DIAGNOSIS — Z98.890 S/P BREAST RECONSTRUCTION: Primary | ICD-10-CM

## 2024-04-25 DIAGNOSIS — Z09 SURGERY FOLLOW-UP EXAMINATION: ICD-10-CM

## 2024-04-25 PROCEDURE — 1159F MED LIST DOCD IN RCRD: CPT | Mod: CPTII,,, | Performed by: SURGERY

## 2024-04-25 PROCEDURE — 99213 OFFICE O/P EST LOW 20 MIN: CPT | Mod: PBBFAC | Performed by: SURGERY

## 2024-04-25 PROCEDURE — 3075F SYST BP GE 130 - 139MM HG: CPT | Mod: CPTII,,, | Performed by: SURGERY

## 2024-04-25 PROCEDURE — 3079F DIAST BP 80-89 MM HG: CPT | Mod: CPTII,,, | Performed by: SURGERY

## 2024-04-25 PROCEDURE — 4010F ACE/ARB THERAPY RXD/TAKEN: CPT | Mod: CPTII,,, | Performed by: SURGERY

## 2024-04-25 PROCEDURE — 99999 PR PBB SHADOW E&M-EST. PATIENT-LVL III: CPT | Mod: PBBFAC,,, | Performed by: SURGERY

## 2024-04-25 PROCEDURE — 99024 POSTOP FOLLOW-UP VISIT: CPT | Mod: ,,, | Performed by: SURGERY

## 2024-04-25 RX ORDER — CIPROFLOXACIN 500 MG/1
500 TABLET ORAL EVERY 12 HOURS
Qty: 20 TABLET | Refills: 0 | Status: SHIPPED | OUTPATIENT
Start: 2024-04-25 | End: 2024-05-05

## 2024-04-25 RX ORDER — CLINDAMYCIN HYDROCHLORIDE 300 MG/1
300 CAPSULE ORAL 3 TIMES DAILY
Qty: 30 CAPSULE | Refills: 0 | Status: SHIPPED | OUTPATIENT
Start: 2024-04-25 | End: 2024-05-05

## 2024-04-25 NOTE — PROGRESS NOTES
Patient presents to Plastic surgery Clinic after having bilateral breast reconstruction.  Patient had bilateral implants with AlloDerm.  She has had trouble on the right side with some serous leakage.  She send a picture which looked like a very swollen breast yesterday we elected see her today.  After evaluation I decided to place a drain.  Patient was prepped and draped in a normal sterile fashion.  Proximally 4 cm incision in the lateral aspect of the breast was then made.  Blunt dissection then proceeded into the pocket itself.  The AlloDerm inferiorly was not incorporated.  A drain was then placed and secured.  The incision was then closed using a running 2-0 Vicryl followed by interrupted 3-0 nylon alternating vertical mattress and simple sutures.  No complications

## 2024-04-27 ENCOUNTER — PATIENT MESSAGE (OUTPATIENT)
Dept: PLASTIC SURGERY | Facility: CLINIC | Age: 59
End: 2024-04-27
Payer: MEDICAID

## 2024-04-29 ENCOUNTER — PATIENT MESSAGE (OUTPATIENT)
Dept: PLASTIC SURGERY | Facility: CLINIC | Age: 59
End: 2024-04-29
Payer: MEDICAID

## 2024-04-29 DIAGNOSIS — Z98.890 S/P BREAST RECONSTRUCTION: Primary | ICD-10-CM

## 2024-04-29 DIAGNOSIS — N64.89 RECURRENT SEROMA OF BREAST: ICD-10-CM

## 2024-05-08 ENCOUNTER — OFFICE VISIT (OUTPATIENT)
Dept: PLASTIC SURGERY | Facility: CLINIC | Age: 59
End: 2024-05-08
Payer: MEDICAID

## 2024-05-08 VITALS
HEIGHT: 60 IN | HEART RATE: 94 BPM | DIASTOLIC BLOOD PRESSURE: 67 MMHG | BODY MASS INDEX: 35.31 KG/M2 | WEIGHT: 179.88 LBS | SYSTOLIC BLOOD PRESSURE: 104 MMHG

## 2024-05-08 DIAGNOSIS — Z09 SURGERY FOLLOW-UP EXAMINATION: Primary | ICD-10-CM

## 2024-05-08 PROCEDURE — 99024 POSTOP FOLLOW-UP VISIT: CPT | Mod: ,,, | Performed by: SURGERY

## 2024-05-08 PROCEDURE — 1159F MED LIST DOCD IN RCRD: CPT | Mod: CPTII,,, | Performed by: SURGERY

## 2024-05-08 PROCEDURE — 3078F DIAST BP <80 MM HG: CPT | Mod: CPTII,,, | Performed by: SURGERY

## 2024-05-08 PROCEDURE — 99213 OFFICE O/P EST LOW 20 MIN: CPT | Mod: PBBFAC | Performed by: SURGERY

## 2024-05-08 PROCEDURE — 4010F ACE/ARB THERAPY RXD/TAKEN: CPT | Mod: CPTII,,, | Performed by: SURGERY

## 2024-05-08 PROCEDURE — 99999 PR PBB SHADOW E&M-EST. PATIENT-LVL III: CPT | Mod: PBBFAC,,, | Performed by: SURGERY

## 2024-05-08 PROCEDURE — 3074F SYST BP LT 130 MM HG: CPT | Mod: CPTII,,, | Performed by: SURGERY

## 2024-05-14 NOTE — PROGRESS NOTES
Patient presents to the Plastic surgery Clinic after having bilateral breast reconstruction.  She presented today because she believes that the right breast is larger now.  There is no evidence of any fluid she has a capsular contracture on the right.  I do not see any sign of infection or seroma.  We will keep monitoring her closely.

## 2024-05-20 ENCOUNTER — OFFICE VISIT (OUTPATIENT)
Dept: PLASTIC SURGERY | Facility: CLINIC | Age: 59
End: 2024-05-20
Payer: MEDICAID

## 2024-05-20 VITALS
HEIGHT: 60 IN | DIASTOLIC BLOOD PRESSURE: 74 MMHG | SYSTOLIC BLOOD PRESSURE: 105 MMHG | WEIGHT: 179.88 LBS | HEART RATE: 94 BPM | BODY MASS INDEX: 35.31 KG/M2

## 2024-05-20 DIAGNOSIS — Z09 SURGERY FOLLOW-UP EXAMINATION: Primary | ICD-10-CM

## 2024-05-20 PROCEDURE — 3074F SYST BP LT 130 MM HG: CPT | Mod: CPTII,,, | Performed by: SURGERY

## 2024-05-20 PROCEDURE — 99999 PR PBB SHADOW E&M-EST. PATIENT-LVL II: CPT | Mod: PBBFAC,,, | Performed by: SURGERY

## 2024-05-20 PROCEDURE — 4010F ACE/ARB THERAPY RXD/TAKEN: CPT | Mod: CPTII,,, | Performed by: SURGERY

## 2024-05-20 PROCEDURE — 99024 POSTOP FOLLOW-UP VISIT: CPT | Mod: ,,, | Performed by: SURGERY

## 2024-05-20 PROCEDURE — 99212 OFFICE O/P EST SF 10 MIN: CPT | Mod: PBBFAC | Performed by: SURGERY

## 2024-05-20 PROCEDURE — 3078F DIAST BP <80 MM HG: CPT | Mod: CPTII,,, | Performed by: SURGERY

## 2024-05-20 RX ORDER — SULFAMETHOXAZOLE AND TRIMETHOPRIM 800; 160 MG/1; MG/1
1 TABLET ORAL 2 TIMES DAILY
Qty: 20 TABLET | Refills: 0 | Status: SHIPPED | OUTPATIENT
Start: 2024-05-20 | End: 2024-05-30

## 2024-05-20 NOTE — PROGRESS NOTES
Patient present plastic surgery clinic after having   bilateral breast reconstruction using implants with AlloDerm.  She has had trouble with the chronic seroma which has now resolved.  She does however have a severe capsular contracture on that right side.  Is far too early to take her back for any type of capsulotomy.  I think we will wait for several months down the road to do this.  She continues to have just a mild erythema in 1 small area anteriorly.  We will place her on Bactrim.

## 2024-06-12 ENCOUNTER — ANESTHESIA EVENT (OUTPATIENT)
Dept: SURGERY | Facility: HOSPITAL | Age: 59
End: 2024-06-12
Payer: MEDICAID

## 2024-06-12 ENCOUNTER — HOSPITAL ENCOUNTER (INPATIENT)
Facility: HOSPITAL | Age: 59
LOS: 3 days | Discharge: HOME | DRG: 909 | End: 2024-06-15
Attending: EMERGENCY MEDICINE | Admitting: SURGERY
Payer: MEDICAID

## 2024-06-12 ENCOUNTER — OFFICE VISIT (OUTPATIENT)
Dept: PLASTIC SURGERY | Facility: CLINIC | Age: 59
DRG: 909 | End: 2024-06-12
Payer: MEDICAID

## 2024-06-12 VITALS
SYSTOLIC BLOOD PRESSURE: 117 MMHG | DIASTOLIC BLOOD PRESSURE: 79 MMHG | WEIGHT: 179.88 LBS | HEIGHT: 60 IN | BODY MASS INDEX: 35.31 KG/M2

## 2024-06-12 DIAGNOSIS — N61.0 CELLULITIS OF RIGHT BREAST: ICD-10-CM

## 2024-06-12 DIAGNOSIS — Z09 SURGERY FOLLOW-UP EXAMINATION: Primary | ICD-10-CM

## 2024-06-12 DIAGNOSIS — T85.79XA: Primary | ICD-10-CM

## 2024-06-12 DIAGNOSIS — N61.1 ABSCESS OF RIGHT BREAST: ICD-10-CM

## 2024-06-12 LAB
ALBUMIN SERPL BCP-MCNC: 3.4 G/DL (ref 3.5–5.2)
ALP SERPL-CCNC: 94 U/L (ref 55–135)
ALT SERPL W/O P-5'-P-CCNC: 21 U/L (ref 10–44)
ANION GAP SERPL CALC-SCNC: 12 MMOL/L (ref 8–16)
AST SERPL-CCNC: 14 U/L (ref 10–40)
BASOPHILS # BLD AUTO: 0.1 K/UL (ref 0–0.2)
BASOPHILS NFR BLD: 1.3 % (ref 0–1.9)
BILIRUB SERPL-MCNC: 0.4 MG/DL (ref 0.1–1)
BUN SERPL-MCNC: 16 MG/DL (ref 6–20)
CALCIUM SERPL-MCNC: 10.1 MG/DL (ref 8.7–10.5)
CHLORIDE SERPL-SCNC: 108 MMOL/L (ref 95–110)
CO2 SERPL-SCNC: 21 MMOL/L (ref 23–29)
CREAT SERPL-MCNC: 1 MG/DL (ref 0.5–1.4)
CRP SERPL-MCNC: 53.9 MG/L (ref 0–8.2)
DIFFERENTIAL METHOD BLD: ABNORMAL
EOSINOPHIL # BLD AUTO: 0.2 K/UL (ref 0–0.5)
EOSINOPHIL NFR BLD: 2.4 % (ref 0–8)
ERYTHROCYTE [DISTWIDTH] IN BLOOD BY AUTOMATED COUNT: 12.4 % (ref 11.5–14.5)
EST. GFR  (NO RACE VARIABLE): >60 ML/MIN/1.73 M^2
GLUCOSE SERPL-MCNC: 98 MG/DL (ref 70–110)
HCT VFR BLD AUTO: 44.4 % (ref 37–48.5)
HCV AB SERPL QL IA: NORMAL
HGB BLD-MCNC: 15.2 G/DL (ref 12–16)
HIV 1+2 AB+HIV1 P24 AG SERPL QL IA: NORMAL
IMM GRANULOCYTES # BLD AUTO: 0.02 K/UL (ref 0–0.04)
IMM GRANULOCYTES NFR BLD AUTO: 0.3 % (ref 0–0.5)
LYMPHOCYTES # BLD AUTO: 1.3 K/UL (ref 1–4.8)
LYMPHOCYTES NFR BLD: 17 % (ref 18–48)
MCH RBC QN AUTO: 32.5 PG (ref 27–31)
MCHC RBC AUTO-ENTMCNC: 34.2 G/DL (ref 32–36)
MCV RBC AUTO: 95 FL (ref 82–98)
MONOCYTES # BLD AUTO: 0.6 K/UL (ref 0.3–1)
MONOCYTES NFR BLD: 7.2 % (ref 4–15)
NEUTROPHILS # BLD AUTO: 5.5 K/UL (ref 1.8–7.7)
NEUTROPHILS NFR BLD: 71.8 % (ref 38–73)
NRBC BLD-RTO: 0 /100 WBC
PLATELET # BLD AUTO: 220 K/UL (ref 150–450)
PMV BLD AUTO: 10.2 FL (ref 9.2–12.9)
POTASSIUM SERPL-SCNC: 4.3 MMOL/L (ref 3.5–5.1)
PROT SERPL-MCNC: 7 G/DL (ref 6–8.4)
RBC # BLD AUTO: 4.67 M/UL (ref 4–5.4)
SODIUM SERPL-SCNC: 141 MMOL/L (ref 136–145)
WBC # BLD AUTO: 7.61 K/UL (ref 3.9–12.7)

## 2024-06-12 PROCEDURE — 4010F ACE/ARB THERAPY RXD/TAKEN: CPT | Mod: CPTII,,, | Performed by: SURGERY

## 2024-06-12 PROCEDURE — 63600175 PHARM REV CODE 636 W HCPCS: Performed by: STUDENT IN AN ORGANIZED HEALTH CARE EDUCATION/TRAINING PROGRAM

## 2024-06-12 PROCEDURE — 3074F SYST BP LT 130 MM HG: CPT | Mod: CPTII,,, | Performed by: SURGERY

## 2024-06-12 PROCEDURE — 99212 OFFICE O/P EST SF 10 MIN: CPT | Mod: PBBFAC,25 | Performed by: SURGERY

## 2024-06-12 PROCEDURE — 63600175 PHARM REV CODE 636 W HCPCS: Performed by: SURGERY

## 2024-06-12 PROCEDURE — 99024 POSTOP FOLLOW-UP VISIT: CPT | Mod: ,,, | Performed by: SURGERY

## 2024-06-12 PROCEDURE — 99285 EMERGENCY DEPT VISIT HI MDM: CPT | Mod: 25,27

## 2024-06-12 PROCEDURE — 87389 HIV-1 AG W/HIV-1&-2 AB AG IA: CPT | Performed by: PHYSICIAN ASSISTANT

## 2024-06-12 PROCEDURE — 99999 PR PBB SHADOW E&M-EST. PATIENT-LVL II: CPT | Mod: PBBFAC,,, | Performed by: SURGERY

## 2024-06-12 PROCEDURE — 12000002 HC ACUTE/MED SURGE SEMI-PRIVATE ROOM

## 2024-06-12 PROCEDURE — 85025 COMPLETE CBC W/AUTO DIFF WBC: CPT | Performed by: EMERGENCY MEDICINE

## 2024-06-12 PROCEDURE — 25000003 PHARM REV CODE 250: Performed by: STUDENT IN AN ORGANIZED HEALTH CARE EDUCATION/TRAINING PROGRAM

## 2024-06-12 PROCEDURE — 25000003 PHARM REV CODE 250: Performed by: EMERGENCY MEDICINE

## 2024-06-12 PROCEDURE — 80053 COMPREHEN METABOLIC PANEL: CPT | Performed by: EMERGENCY MEDICINE

## 2024-06-12 PROCEDURE — 3078F DIAST BP <80 MM HG: CPT | Mod: CPTII,,, | Performed by: SURGERY

## 2024-06-12 PROCEDURE — 63600175 PHARM REV CODE 636 W HCPCS: Performed by: EMERGENCY MEDICINE

## 2024-06-12 PROCEDURE — 86803 HEPATITIS C AB TEST: CPT | Performed by: PHYSICIAN ASSISTANT

## 2024-06-12 PROCEDURE — 86140 C-REACTIVE PROTEIN: CPT | Performed by: EMERGENCY MEDICINE

## 2024-06-12 RX ORDER — ACETAMINOPHEN 325 MG/1
650 TABLET ORAL EVERY 4 HOURS PRN
Status: DISCONTINUED | OUTPATIENT
Start: 2024-06-12 | End: 2024-06-15 | Stop reason: HOSPADM

## 2024-06-12 RX ORDER — PROCHLORPERAZINE EDISYLATE 5 MG/ML
5 INJECTION INTRAMUSCULAR; INTRAVENOUS EVERY 6 HOURS PRN
Status: DISCONTINUED | OUTPATIENT
Start: 2024-06-12 | End: 2024-06-15 | Stop reason: HOSPADM

## 2024-06-12 RX ORDER — ENOXAPARIN SODIUM 100 MG/ML
40 INJECTION SUBCUTANEOUS EVERY 24 HOURS
Status: DISCONTINUED | OUTPATIENT
Start: 2024-06-12 | End: 2024-06-15 | Stop reason: HOSPADM

## 2024-06-12 RX ORDER — POLYETHYLENE GLYCOL 3350 17 G/17G
17 POWDER, FOR SOLUTION ORAL DAILY
Status: DISCONTINUED | OUTPATIENT
Start: 2024-06-12 | End: 2024-06-15 | Stop reason: HOSPADM

## 2024-06-12 RX ORDER — SODIUM CHLORIDE 0.9 % (FLUSH) 0.9 %
10 SYRINGE (ML) INJECTION
Status: DISCONTINUED | OUTPATIENT
Start: 2024-06-12 | End: 2024-06-15 | Stop reason: HOSPADM

## 2024-06-12 RX ORDER — PANTOPRAZOLE SODIUM 40 MG/1
40 TABLET, DELAYED RELEASE ORAL DAILY
Status: DISCONTINUED | OUTPATIENT
Start: 2024-06-12 | End: 2024-06-15 | Stop reason: HOSPADM

## 2024-06-12 RX ORDER — IPRATROPIUM BROMIDE AND ALBUTEROL SULFATE 2.5; .5 MG/3ML; MG/3ML
3 SOLUTION RESPIRATORY (INHALATION) EVERY 6 HOURS PRN
Status: DISCONTINUED | OUTPATIENT
Start: 2024-06-12 | End: 2024-06-15 | Stop reason: HOSPADM

## 2024-06-12 RX ORDER — HYDROMORPHONE HYDROCHLORIDE 1 MG/ML
1 INJECTION, SOLUTION INTRAMUSCULAR; INTRAVENOUS; SUBCUTANEOUS EVERY 4 HOURS PRN
Status: DISCONTINUED | OUTPATIENT
Start: 2024-06-12 | End: 2024-06-15 | Stop reason: HOSPADM

## 2024-06-12 RX ORDER — ONDANSETRON HYDROCHLORIDE 2 MG/ML
4 INJECTION, SOLUTION INTRAVENOUS EVERY 8 HOURS PRN
Status: DISCONTINUED | OUTPATIENT
Start: 2024-06-12 | End: 2024-06-15 | Stop reason: HOSPADM

## 2024-06-12 RX ORDER — BUSPIRONE HYDROCHLORIDE 7.5 MG/1
7.5 TABLET ORAL 3 TIMES DAILY
COMMUNITY

## 2024-06-12 RX ORDER — OXYCODONE HYDROCHLORIDE 5 MG/1
5 TABLET ORAL EVERY 4 HOURS PRN
Status: DISCONTINUED | OUTPATIENT
Start: 2024-06-12 | End: 2024-06-15 | Stop reason: HOSPADM

## 2024-06-12 RX ORDER — ALPRAZOLAM 0.5 MG/1
0.5 TABLET ORAL 2 TIMES DAILY PRN
Status: DISCONTINUED | OUTPATIENT
Start: 2024-06-12 | End: 2024-06-15 | Stop reason: HOSPADM

## 2024-06-12 RX ORDER — SODIUM CHLORIDE, SODIUM LACTATE, POTASSIUM CHLORIDE, CALCIUM CHLORIDE 600; 310; 30; 20 MG/100ML; MG/100ML; MG/100ML; MG/100ML
INJECTION, SOLUTION INTRAVENOUS CONTINUOUS
Status: DISCONTINUED | OUTPATIENT
Start: 2024-06-12 | End: 2024-06-15 | Stop reason: HOSPADM

## 2024-06-12 RX ORDER — ENOXAPARIN SODIUM 100 MG/ML
30 INJECTION SUBCUTANEOUS EVERY 24 HOURS
Status: DISCONTINUED | OUTPATIENT
Start: 2024-06-12 | End: 2024-06-12

## 2024-06-12 RX ADMIN — ALPRAZOLAM 0.5 MG: 0.5 TABLET ORAL at 09:06

## 2024-06-12 RX ADMIN — SODIUM CHLORIDE, POTASSIUM CHLORIDE, SODIUM LACTATE AND CALCIUM CHLORIDE: 600; 310; 30; 20 INJECTION, SOLUTION INTRAVENOUS at 01:06

## 2024-06-12 RX ADMIN — OXYCODONE 5 MG: 5 TABLET ORAL at 07:06

## 2024-06-12 RX ADMIN — DEXTROSE MONOHYDRATE 1250 MG: 50 INJECTION, SOLUTION INTRAVENOUS at 12:06

## 2024-06-12 RX ADMIN — PANTOPRAZOLE SODIUM 40 MG: 40 TABLET, DELAYED RELEASE ORAL at 01:06

## 2024-06-12 RX ADMIN — ENOXAPARIN SODIUM 40 MG: 40 INJECTION SUBCUTANEOUS at 05:06

## 2024-06-12 NOTE — PROGRESS NOTES
Plastic Surgery Clinic Postop Visit    Subjective:      Deanna Mcclain is a 59 y.o. year old female who presents to the Plastic Surgery Clinic on 06/12/2024 for follow up visit status post bilateral breast reconstruction with implants.  The patient states that on Saturday evening she started to experience issues with tightness, redness and pain in her right breast.  The patient states that it became more painful and has not stopped.  The patient states that she has had some draining from the right breast as well.  The patient does endorse fevers and chills on Saturday but states that they have resolved since then.  There was concern for a right implant that is infected..     Vitals:    06/12/24 0950   BP: 117/79        Review of patient's allergies indicates:  No Known Allergies    Current Outpatient Medications on File Prior to Visit   Medication Sig Dispense Refill    albuterol (PROVENTIL/VENTOLIN HFA) 90 mcg/actuation inhaler Inhale 2 puffs into the lungs.      albuterol (PROVENTIL/VENTOLIN HFA) 90 mcg/actuation inhaler       alendronate (FOSAMAX) 70 MG tablet TAKE ONE TABLET BY MOUTH EVERY WEEK AS DIRECTED      ALPRAZolam (XANAX) 0.5 MG tablet Take 0.5 mg by mouth.      amLODIPine (NORVASC) 5 MG tablet Take 5 mg by mouth once daily.      cholecalciferol, vitamin D3, 1,250 mcg (50,000 unit) capsule Take 50,000 Units by mouth every 7 days.      clobetasol 0.05% (TEMOVATE) 0.05 % Oint APPLY OINTMENT TOPICALLY TO AFFECTED AREA AS NEEDED 15 g 0    ergocalciferol (ERGOCALCIFEROL) 50,000 unit Cap Take 50,000 Units by mouth.      escitalopram oxalate (LEXAPRO) 20 MG tablet Take 20 mg by mouth once daily.      gabapentin (NEURONTIN) 300 MG capsule Take 300 mg by mouth 3 (three) times daily.      INCRUSE ELLIPTA 62.5 mcg/actuation DsDv Take by mouth once daily.      linaCLOtide (LINZESS) 145 mcg Cap capsule Take 145 mcg by mouth.      lisinopril-hydrochlorothiazide (PRINZIDE,ZESTORETIC) 20-12.5 mg per tablet Take  1 tablet by mouth once daily.      LORazepam (ATIVAN) 0.5 MG tablet TAKE 1 TABLET BY MOUTH TWICE DAILY. MUST SEE DOCTOR FOR FURTHER REFILLS      NON FORMULARY MEDICATION CPAP      omeprazole (PRILOSEC) 20 MG capsule Take 20 mg by mouth once daily.      oxyCODONE (ROXICODONE) 5 MG immediate release tablet Take 1 tablet (5 mg total) by mouth every 4 (four) hours as needed for Pain. 18 tablet 0    promethazine (PHENERGAN) 25 MG tablet Take 25 mg by mouth.      traMADol (ULTRAM) 50 mg tablet Take 50 mg by mouth 3 (three) times daily as needed.       Current Facility-Administered Medications on File Prior to Visit   Medication Dose Route Frequency Provider Last Rate Last Admin    0.9%  NaCl infusion   Intravenous Continuous Hellen Rodgers MD        ceFAZolin 2 g in dextrose 5 % in water (D5W) 50 mL IVPB (MB+)  2 g Intravenous On Call Procedure Hellen Rodgers MD        fentaNYL 50 mcg/mL injection 25 mcg  25 mcg Intravenous Q5 Min PRN Keiko Gonzalez MD        haloperidol lactate injection 0.5 mg  0.5 mg Intravenous Q10 Min PRN Keiko Gonzalez MD        HYDROmorphone injection 0.2 mg  0.2 mg Intravenous Q5 Min PRN Keiko Gonzalez MD   0.2 mg at 02/29/24 1225    oxyCODONE immediate release tablet 5 mg  5 mg Oral Q3H PRN Keiko Gonzalez MD   5 mg at 02/29/24 1223       Patient Active Problem List   Diagnosis    Adrenal benign neoplasm       Past Surgical History:   Procedure Laterality Date    BREAST RECONSTRUCTION Bilateral 2/29/2024    Procedure: RECONSTRUCTION, BREAST IMPLANT BASED;  Surgeon: Brad Zavala MD;  Location: [facility] OR [address] [place] [address];  Service: Plastics;  Laterality: Bilateral;    BREAST SURGERY Bilateral 11/2012    CHOLECYSTECTOMY  2012    GASTRIC BYPASS  2008    Gastric Bypass Reversal  2012    HYSTERECTOMY      TOTAL ABDOMINAL HYSTERECTOMY W/ BILATERAL SALPINGOOPHORECTOMY  2001    Teratoma with hair and teeth       Social History     Socioeconomic History    Marital status:     Tobacco Use    Smoking status: Former     Current packs/day: 0.00     Average packs/day: 1 pack/day for 30.0 years (30.0 ttl pk-yrs)     Types: Cigarettes     Start date: 11/3/1979     Quit date: 11/3/2009     Years since quittin.6   Substance and Sexual Activity    Alcohol use: Not Currently     Social Determinants of Health     Financial Resource Strain: Patient Declined (2024)    Overall Financial Resource Strain (CARDIA)     Difficulty of Paying Living Expenses: Patient declined   Food Insecurity: No Food Insecurity (2024)    Hunger Vital Sign     Worried About Running Out of Food in the Last Year: Never true     Ran Out of Food in the Last Year: Never true   Transportation Needs: No Transportation Needs (2024)    PRAPARE - Transportation     Lack of Transportation (Medical): No     Lack of Transportation (Non-Medical): No   Physical Activity: Sufficiently Active (2024)    Exercise Vital Sign     Days of Exercise per Week: 6 days     Minutes of Exercise per Session: 70 min   Stress: No Stress Concern Present (2024)    [employer] - Occupational Stress Questionnaire     Feeling of Stress : Not at all   Housing Stability: Unknown (2024)    Housing Stability Vital Sign     Unable to Pay for Housing in the Last Year: Patient declined     Number of Places Lived in the Last Year: 1     Unstable Housing in the Last Year: No           Review of Systems:   Constitutional: Denies fever/chills  Eyes: Denies change in vision  ENT: Denies sore throat or rhinorrhea   Respiratory: Denies shortness of breath or cough  Cardiovascular: Denies chest pain or palpitations  Gastrointestinal: Denies abdominal pain, nausea, or vomiting  Genitourinary: Denies dysuria and flank pain.   Skin: Denies new rash or skin lesions.   Allergic/Immunologic: Denies adverse reactions to current medications  Neurological: Denies headaches or dizziness  Musculoskeletal: Denies  arthralgias.     Objective:     Physical Exam:  Vitals:    06/12/24 0950   BP: 117/79       WD WN NAD  VSS  Normal resp effort  Right implant with concern for infection, erythema is present no obvious evidence of the fluid collection        Assessment:       No diagnosis found.    Plan:   59 y.o. female status post breast reconstruction with implant based  - due to concern for infection we will admit the patient to the ER to be admitted to the floor, we will get a right breast ultrasound, basic labs and start the patient on vancomycin.  - depending on the results of the ultrasound we will plan to take the patient to the operating room for washout and removal versus possible replacement of the implant      All questions were answered. The patient was advised to call the clinic with any questions or concerns prior to their next visit.       Jayson Mcdonald MD- Fellow  Plastic and Reconstructive Surgery

## 2024-06-12 NOTE — PROGRESS NOTES
Pharmacist Renal Dose Adjustment Note    Deanna Mcclain is a 59 y.o. female being treated with the medication Enoxaparin    Patient Data:    Vital Signs (Most Recent):  Temp: 98 °F (36.7 °C) (06/12/24 1215)  Pulse: 66 (06/12/24 1215)  Resp: 16 (06/12/24 1215)  BP: 110/64 (06/12/24 1215)  SpO2: 98 % (06/12/24 1215) Vital Signs (72h Range):  Temp:  [97.9 °F (36.6 °C)-98 °F (36.7 °C)]   Pulse:  [62-66]   Resp:  [16-19]   BP: (105-117)/(60-79)   SpO2:  [98 %]      Recent Labs   Lab 06/12/24  1139   CREATININE 1.0     Serum creatinine: 1 mg/dL 06/12/24 1139  Estimated creatinine clearance: 59.8 mL/min    Enoxaparin 30 mg daily will be changed to Enoxaparin 40 mg daily    Pharmacist's Name: Sue De La Cruz  Pharmacist's Extension: [phone]

## 2024-06-12 NOTE — ANESTHESIA PREPROCEDURE EVALUATION
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 female who  has a past medical history of Adrenal benign neoplasm, Breast carcinoma, COPD (chronic obstructive pulmonary disease), HTN (hypertension), benign, DAQUAN on CPAP, Osteoporosis, Panic disorder, and Vitamin D deficiency.  No notes on file    Deanna Mcclain is a 59 y.o. female w/ a significant PMHx of HTN, DAQUAN, BMI 36, hx of gastric bypass, and prior mastectomy 9 years ago. S/p BL breast reconstruction with implants on 02/29/24, now with infected R implant    Pt reports that approximately 10 years ago she underwent emergency exlap for sbo and reports "dead stomach" was discovered at this time. She also states she had a large aspiration event during this surgery requiring icu admission and prolonged ventilator support. Would recommend RSI.    Patient now presents for the above procedure(s).    Prev airway:   Intubation     Date/Time: 2/29/2024 7:45 AM     Performed by: Keiko Gonzalez MD  Authorized by: Keiko Gonzalez MD    Intubation:     Induction:  Rapid sequence induction    Intubated:  Postinduction    Mask Ventilation:  Not attempted    Attempts:  1    Attempted By:  Staff anesthesiologist    Method of Intubation:  Video laryngoscopy    Blade:  " Herring 3    Laryngeal View Grade: Grade I - full view of cords      Difficult Airway Encountered?: No      Complications:  None    Airway Device:  Oral endotracheal tube    Airway Device Size:  7.0    Style/Cuff Inflation:  Cuffed (inflated to minimal occlusive pressure)    Tube secured:  21    Secured at:  The teeth    Placement Verified By:  Capnometry    Complicating Factors:  None    Findings Post-Intubation:  BS equal bilateral and atraumatic/condition of teeth unchanged     lactated ringers   Intravenous Continuous 75 mL/hr at 06/13/24 0509 New Bag at 06/13/24 0509     Hospital LOS: 1 days  ICU LOS: Patient does not have an ICU stay during this admission.    she has a current medication list which includes the following long-term medication(s): lisinopril-hydrochlorothiazide, omeprazole, albuterol, albuterol, alendronate, alprazolam, amlodipine, buspirone, clobetasol 0.05%, escitalopram oxalate, gabapentin, and lorazepam.     ALLERGIES:   Review of patient's allergies indicates:  No Known Allergies  LDA:   AIRWAY:         * No LDAs found *      Lines/Drains/Airways       Peripheral Intravenous Line  Duration                  Peripheral IV - Single Lumen 06/12/24 1904 20 G Posterior;Right Hand <1 day                   Anesthesia Evaluation      Airway   Mallampati: II  TM distance: Normal  Neck ROM: Normal ROM  Dental    (+) Dentures and Partial Dentures    Pulmonary    (+) COPD, sleep apnea on CPAP  Cardiovascular   (+) hypertension    Neuro/Psych    (+) psychiatric history    GI/Hepatic/Renal      Endo/Other    Abdominal                    MEDICATIONS:     Current Outpatient Medications on File Prior to Encounter   Medication Sig Dispense Refill Last Dose    lisinopril-hydrochlorothiazide (PRINZIDE,ZESTORETIC) 20-12.5 mg per tablet Take 1 tablet by mouth once daily.   6/11/2024    omeprazole (PRILOSEC) 20 MG capsule Take 20 mg by mouth once daily.   6/11/2024    promethazine (PHENERGAN) 25 MG tablet Take 25  "mg by mouth.   6/11/2024    albuterol (PROVENTIL/VENTOLIN HFA) 90 mcg/actuation inhaler Inhale 2 puffs into the lungs.       albuterol (PROVENTIL/VENTOLIN HFA) 90 mcg/actuation inhaler        alendronate (FOSAMAX) 70 MG tablet TAKE ONE TABLET BY MOUTH EVERY WEEK AS DIRECTED       ALPRAZolam (XANAX) 0.5 MG tablet Take 0.5 mg by mouth.       amLODIPine (NORVASC) 5 MG tablet Take 5 mg by mouth once daily.       busPIRone (BUSPAR) 7.5 MG tablet Take 7.5 mg by mouth 3 (three) times daily.       cholecalciferol, vitamin D3, 1,250 mcg (50,000 unit) capsule Take 50,000 Units by mouth every 7 days.       clobetasol 0.05% (TEMOVATE) 0.05 % Oint APPLY OINTMENT TOPICALLY TO AFFECTED AREA AS NEEDED 15 g 0     ergocalciferol (ERGOCALCIFEROL) 50,000 unit Cap Take 50,000 Units by mouth.       escitalopram oxalate (LEXAPRO) 20 MG tablet Take 20 mg by mouth once daily.       gabapentin (NEURONTIN) 300 MG capsule Take 300 mg by mouth 3 (three) times daily.       INCRUSE ELLIPTA 62.5 mcg/actuation DsDv Take by mouth once daily.       linaCLOtide (LINZESS) 145 mcg Cap capsule Take 145 mcg by mouth.       LORazepam (ATIVAN) 0.5 MG tablet TAKE 1 TABLET BY MOUTH TWICE DAILY. MUST SEE DOCTOR FOR FURTHER REFILLS   Unknown    NON FORMULARY MEDICATION CPAP       oxyCODONE (ROXICODONE) 5 MG immediate release tablet Take 1 tablet (5 mg total) by mouth every 4 (four) hours as needed for Pain. 18 tablet 0     traMADol (ULTRAM) 50 mg tablet Take 50 mg by mouth 3 (three) times daily as needed.         Inpatient Medications:  Antibiotics (From admission, onward)      Start     Stop Route Frequency Ordered    06/13/24 1200  vancomycin 1,250 mg in dextrose 5 % (D5W) 250 mL IVPB (Vial-Mate)         -- IV Every 24 hours (non-standard times) 06/12/24 1401    06/12/24 1259  vancomycin - pharmacy to dose  (vancomycin IVPB (PEDS and ADULTS))        Placed in "And" Linked Group    -- IV pharmacy to manage frequency 06/12/24 0314          VTE Risk Mitigation " (From admission, onward)           Ordered     enoxaparin injection 40 mg  Every 24 hours         06/12/24 1356     Place ALLEGRA hose  Until discontinued         06/12/24 1154     Place sequential compression device  Until discontinued         06/12/24 1154     IP VTE HIGH RISK PATIENT  Once         06/12/24 1154     Place sequential compression device  Until discontinued         06/12/24 1154                   enoxparin  40 mg Subcutaneous Q24H (prophylaxis, 1700)    pantoprazole  40 mg Oral Daily    polyethylene glycol  17 g Oral Daily    vancomycin (VANCOCIN) IV (PEDS and ADULTS)  1,250 mg Intravenous Q24H       Current Facility-Administered Medications   Medication Dose Route Frequency Provider Last Rate Last Admin    acetaminophen tablet 650 mg  650 mg Oral Q4H PRN Jayson Mcdonald MD        albuterol-ipratropium 2.5 mg-0.5 mg/3 mL nebulizer solution 3 mL  3 mL Nebulization Q6H PRN Jayson Mcdonald MD        ALPRAZolam tablet 0.5 mg  0.5 mg Oral BID PRN Jayson Mcdonald MD   0.5 mg at 06/13/24 1006    enoxaparin injection 40 mg  40 mg Subcutaneous Q24H (prophylaxis, 1700) Brad Zavala MD   40 mg at 06/12/24 1754    HYDROmorphone injection 1 mg  1 mg Intravenous Q4H PRN Jayson Mcdonald MD        lactated ringers infusion   Intravenous Continuous Jayson Mcdonald MD 75 mL/hr at 06/13/24 0509 New Bag at 06/13/24 0509    ondansetron injection 4 mg  4 mg Intravenous Q8H PRN Jayson Mcdonald MD        oxyCODONE immediate release tablet 5 mg  5 mg Oral Q4H PRN Jayson Mcdonald MD   5 mg at 06/12/24 1906    pantoprazole EC tablet 40 mg  40 mg Oral Daily Jayson Mcdonald MD   40 mg at 06/12/24 1308    polyethylene glycol packet 17 g  17 g Oral Daily Jayson Mcdonald MD        prochlorperazine injection Soln 5 mg  5 mg Intravenous Q6H PRN Jayson Mcdonald MD        sodium chloride 0.9% flush 10 mL  10 mL Intravenous PRN Jayson Mcdonald MD        vancomycin - pharmacy to dose   Intravenous pharmacy to manage frequency Jayson Mcdonald  MD        vancomycin 1,250 mg in dextrose 5 % (D5W) 250 mL IVPB (Vial-Mate)  1,250 mg Intravenous Q24H Brad Zavala MD         Facility-Administered Medications Ordered in Other Encounters   Medication Dose Route Frequency Provider Last Rate Last Admin    0.9%  NaCl infusion   Intravenous Continuous Hellen Rodgers MD        ceFAZolin 2 g in dextrose 5 % in water (D5W) 50 mL IVPB (MB+)  2 g Intravenous On Call Procedure Hellen Rodgers MD        fentaNYL 50 mcg/mL injection 25 mcg  25 mcg Intravenous Q5 Min PRN Keiko Gonzalez MD        haloperidol lactate injection 0.5 mg  0.5 mg Intravenous Q10 Min PRN Keiko Gonzalez MD        HYDROmorphone injection 0.2 mg  0.2 mg Intravenous Q5 Min PRN Keiko Gonzalez MD   0.2 mg at 02/29/24 1225    oxyCODONE immediate release tablet 5 mg  5 mg Oral Q3H PRN Keiko Gonzalez MD   5 mg at 02/29/24 1223          History:     Active Hospital Problems    Diagnosis  POA    Complication of breast implant [T85.9XXA]  Unknown      Resolved Hospital Problems   No resolved problems to display.     Surgical History:    has a past surgical history that includes Gastric bypass (2008); Gastric Bypass Reversal (2012); Breast surgery (Bilateral, 11/2012); Cholecystectomy (2012); Total abdominal hysterectomy w/ bilateral salpingoophorectomy (2001); Hysterectomy; and Breast reconstruction (Bilateral, 2/29/2024).   Social History:    reports that she is not currently sexually active.  reports that she quit smoking about 14 years ago. Her smoking use included cigarettes. She started smoking about 44 years ago. She has a 30 pack-year smoking history. She does not have any smokeless tobacco history on file. She reports that she does not currently use alcohol. She reports that she does not use drugs.    Vitals:    06/13/24 0512 06/13/24 0939 06/13/24 1115 06/13/24 1155   BP:  (!) 108/55 126/73 (!) 123/56   BP Location:    Left arm   Patient Position:    Lying   Pulse: 64  (!) 55 (!) 59 (!) 56   Resp:   18 18   Temp:  36.7 °C (98.1 °F)  36.6 °C (97.9 °F)   TempSrc:  Oral  Temporal   SpO2: 100% 100% 96% 100%   Weight:       Height:         Vital Signs Range (Last 24H):  Temp:  [36.6 °C (97.9 °F)-36.9 °C (98.4 °F)]   Pulse:  [55-67]   Resp:  [18]   BP: (101-136)/(51-73)   SpO2:  [96 %-100 %]     Body mass index is 36.15 kg/m².  Wt Readings from Last 4 Encounters:   06/12/24 81.2 kg (179 lb)   06/12/24 81.6 kg (179 lb 14.3 oz)   05/20/24 81.6 kg (179 lb 14.3 oz)   05/08/24 81.6 kg (179 lb 14.3 oz)        Intake/Output - Last 3 Shifts         06/11 0700 06/12 0659 06/12 0700 06/13 0659 06/13 0700 06/14 0659    IV Piggyback  248.2     Total Intake(mL/kg)  248.2 (3.1)     Net  +248.2                  Lab Results   Component Value Date    WBC 6.45 06/13/2024    HGB 13.1 06/13/2024    HCT 40.0 06/13/2024     06/13/2024     06/13/2024    K 4.4 06/13/2024     06/13/2024    CREATININE 0.8 06/13/2024    BUN 15 06/13/2024    CO2 20 (L) 06/13/2024     (H) 06/13/2024    CALCIUM 9.0 06/13/2024    ALKPHOS 94 06/12/2024    ALT 21 06/12/2024    AST 14 06/12/2024    ALBUMIN 3.4 (L) 06/12/2024     Recent Results (from the past 12 hour(s))   CBC auto differential    Collection Time: 06/13/24  5:12 AM   Result Value Ref Range    WBC 6.45 3.90 - 12.70 K/uL    RBC 4.12 4.00 - 5.40 M/uL    Hemoglobin 13.1 12.0 - 16.0 g/dL    Hematocrit 40.0 37.0 - 48.5 %    MCV 97 82 - 98 fL    MCH 31.8 (H) 27.0 - 31.0 pg    MCHC 32.8 32.0 - 36.0 g/dL    RDW 12.3 11.5 - 14.5 %    Platelets 215 150 - 450 K/uL    MPV 10.5 9.2 - 12.9 fL    Immature Granulocytes 0.3 0.0 - 0.5 %    Gran # (ANC) 3.7 1.8 - 7.7 K/uL    Immature Grans (Abs) 0.02 0.00 - 0.04 K/uL    Lymph # 1.8 1.0 - 4.8 K/uL    Mono # 0.7 0.3 - 1.0 K/uL    Eos # 0.2 0.0 - 0.5 K/uL    Baso # 0.09 0.00 - 0.20 K/uL    nRBC 0 0 /100 WBC    Gran % 57.1 38.0 - 73.0 %    Lymph % 27.1 18.0 - 48.0 %    Mono % 10.5 4.0 - 15.0 %    Eosinophil % 3.6  "0.0 - 8.0 %    Basophil % 1.4 0.0 - 1.9 %    Differential Method Automated    Basic metabolic panel    Collection Time: 06/13/24  5:12 AM   Result Value Ref Range    Sodium 138 136 - 145 mmol/L    Potassium 4.4 3.5 - 5.1 mmol/L    Chloride 110 95 - 110 mmol/L    CO2 20 (L) 23 - 29 mmol/L    Glucose 114 (H) 70 - 110 mg/dL    BUN 15 6 - 20 mg/dL    Creatinine 0.8 0.5 - 1.4 mg/dL    Calcium 9.0 8.7 - 10.5 mg/dL    Anion Gap 8 8 - 16 mmol/L    eGFR >60.0 >60 mL/min/1.73 m^2     Recent Labs   Lab 06/12/24  1139 06/13/24  0512   WBC 7.61 6.45   HGB 15.2 13.1   HCT 44.4 40.0    215    138   K 4.3 4.4   CREATININE 1.0 0.8   GLU 98 114*     No LMP recorded. Patient has had a hysterectomy.    EKG:   Results for orders placed or performed during the hospital encounter of 02/28/24   EKG 12-lead    Collection Time: 02/28/24 11:13 AM   Result Value Ref Range    QRS Duration 90 ms    [facility] QTC Calculation 450 ms    Narrative    Test Reason : Z01.818,I10,    Vent. Rate : 069 BPM     Atrial Rate : 069 BPM     P-R Int : 130 ms          QRS Dur : 090 ms      QT Int : 420 ms       P-R-T Axes : 062 017 036 degrees     QTc Int : 450 ms    Normal sinus rhythm  Nonspecific ST abnormality  Abnormal ECG  No previous ECGs available  Confirmed by Elizabeth Barron MD ([id number]) on 2/28/2024 3:09:35 PM    Referred By: DAVID JHA           Confirmed By:Elizabeth Barron MD     TTE:  No results found for this or any previous visit.  No results found for this or any previous visit.  ADRIANA:  No results found for this or any previous visit.  Stress Test:  No results found for this or any previous visit.     LHC:  No results found for this or any previous visit.     PFT:  No results found for: "FEV1", "FVC", "ABA0UOM", "TLC", "DLCO"             Pre-op Assessment    I have reviewed the Patient Summary Reports.     I have reviewed the Nursing Notes. I have reviewed the NPO Status.   I have reviewed the Medications.     Review of " Systems  Anesthesia Hx:  No problems with previous Anesthesia   History of prior surgery of interest to airway management or planning:          Denies Family Hx of Anesthesia complications.    Denies Personal Hx of Anesthesia complications.                    Hematology/Oncology:  Hematology Normal                                     EENT/Dental:  EENT/Dental Normal           Cardiovascular:     Hypertension                                  Hypertension         Pulmonary:   COPD     Sleep Apnea, CPAP    Chronic Obstructive Pulmonary Disease (COPD):           Obstructive Sleep Apnea (DAQUAN).           Renal/:  Renal/ Normal                 Hepatic/GI:  Hepatic/GI Normal       Prior gastric bypass          Musculoskeletal:  Musculoskeletal Normal                Neurological:  Neurology Normal                                      Psych:  Psychiatric History anxiety                 Physical Exam  General: Well nourished, Cooperative, Alert and Oriented    Airway:  Mallampati: II   Mouth Opening: Normal  TM Distance: Normal  Tongue: Normal  Neck ROM: Normal ROM    Dental:  Dentures, Partial Dentures        Anesthesia Plan  Type of Anesthesia, risks & benefits discussed:    Anesthesia Type: Gen ETT  Intra-op Monitoring Plan: Standard ASA Monitors  Post Op Pain Control Plan: multimodal analgesia  Induction:  rapid sequence and IV  Airway Plan: Video and Direct, Post-Induction  Informed Consent: Informed consent signed with the Patient and all parties understand the risks and agree with anesthesia plan.  All questions answered.   ASA Score: 3  Day of Surgery Review of History & Physical: H&P Update referred to the surgeon/provider.  Anesthesia Plan Notes: Would recommend RSI    Ready For Surgery From Anesthesia Perspective.     .

## 2024-06-12 NOTE — ED PROVIDER NOTES
Encounter Date: 6/12/2024       History     Chief Complaint   Patient presents with    Post-op Problem     Right sided breast implant infection ; breast reconstruction from Breast CA     Patient is a 59-year-old female with past medical history of breast carcinoma s/p double mastectomy with reconstruction, hypertension who presents with pain redness and swelling to her right breast.  She reports she had the reconstruction done in February and has had multiple issues with her right breast since then.  She was seen by Dr. Zavala this morning who recommended admission, starting IV vancomycin and likely taking her to the OR tomorrow.    The history is provided by the patient.     Review of patient's allergies indicates:  No Known Allergies  Past Medical History:   Diagnosis Date    Adrenal benign neoplasm     bilateral, left 9 mm right 11 mm on CT 5-2015    Breast carcinoma     COPD (chronic obstructive pulmonary disease)     HTN (hypertension), benign     DAQUAN on CPAP     Osteoporosis     Panic disorder     Vitamin D deficiency      Past Surgical History:   Procedure Laterality Date    BREAST CAPSULOTOMY  6/13/2024    Procedure: CAPSULOTOMY, BREAST;  Surgeon: Brad Zavala MD;  Location: [address];  Service: Plastics;;    BREAST RECONSTRUCTION Bilateral 2/29/2024    Procedure: RECONSTRUCTION, BREAST IMPLANT BASED;  Surgeon: Brad Zavala MD;  Location: [facility] OR [address] [place] [address];  Service: Plastics;  Laterality: Bilateral;    BREAST SURGERY Bilateral 11/2012    CHOLECYSTECTOMY  2012    GASTRIC BYPASS  2008    Gastric Bypass Reversal  2012    HYSTERECTOMY      REMOVAL OF GRAFT  6/13/2024    Procedure: REMOVAL, GRAFT;  Surgeon: Brad Zavala MD;  Location: [address];  Service: Plastics;;    REPLACEMENT OF IMPLANT OF BREAST  6/13/2024    Procedure: REPLACEMENT, IMPLANT, BREAST;  Surgeon: Brad Zavala MD;  Location: [facility] OR [address] [place] [address];  Service: Plastics;;    TOTAL ABDOMINAL  HYSTERECTOMY W/ BILATERAL SALPINGOOPHORECTOMY      Teratoma with hair and teeth    WASHOUT Right 2024    Procedure: WASHOUT right breast, removal of implant, possible replacement;  Surgeon: Brad Zavala MD;  Location: [facility] OR [address] [place] [address];  Service: Plastics;  Laterality: Right;     Family History   Problem Relation Name Age of Onset    Ovarian cancer Mother      Uterine cancer Mother      COPD Brother      Breast cancer Maternal Grandmother      COPD Maternal Grandfather      Breast cancer Paternal Grandmother      Lung cancer Paternal Grandfather      Breast cancer Maternal Aunt       Social History     Tobacco Use    Smoking status: Former     Current packs/day: 0.00     Average packs/day: 1 pack/day for 30.0 years (30.0 ttl pk-yrs)     Types: Cigarettes     Start date: 11/3/1979     Quit date: 11/3/2009     Years since quittin.6   Substance Use Topics    Alcohol use: Not Currently    Drug use: Never         Physical Exam     Initial Vitals [24 1048]   BP Pulse Resp Temp SpO2   105/60 62 19 97.9 °F (36.6 °C) 98 %      MAP       --         Physical Exam    Nursing note and vitals reviewed.  Constitutional: She appears well-developed and well-nourished. She is not diaphoretic. No distress.   HENT:   Head: Normocephalic and atraumatic.   Eyes: Conjunctivae and EOM are normal.   Neck: Neck supple.   Normal range of motion.  Cardiovascular:  Normal rate and regular rhythm.           Pulmonary/Chest: No respiratory distress.   Right breast is erythematous, TTP, indurated  Left breast with well healed incisional wound   Abdominal: She exhibits no distension.   Musculoskeletal:      Cervical back: Normal range of motion and neck supple.     Neurological: She is alert and oriented to person, place, and time. GCS score is 15. GCS eye subscore is 4. GCS verbal subscore is 5. GCS motor subscore is 6.   Skin: Skin is warm and dry.   Psychiatric: She has a normal mood and affect. Her behavior is  normal. Judgment and thought content normal.         ED Course   Procedures  Labs Reviewed   COMPREHENSIVE METABOLIC PANEL - Abnormal; Notable for the following components:       Result Value    CO2 21 (*)     Albumin 3.4 (*)     All other components within normal limits    Narrative:     add on trop per  /order#[id number] @ 06/12/2024  12:07    CBC W/ AUTO DIFFERENTIAL - Abnormal; Notable for the following components:    MCH 32.5 (*)     Lymph % 17.0 (*)     All other components within normal limits   C-REACTIVE PROTEIN - Abnormal; Notable for the following components:    CRP 53.9 (*)     All other components within normal limits    Narrative:     add on trop per  /order#[id number] @ 06/12/2024  12:07    CBC W/ AUTO DIFFERENTIAL - Abnormal; Notable for the following components:    MCH 31.8 (*)     All other components within normal limits   BASIC METABOLIC PANEL - Abnormal; Notable for the following components:    CO2 20 (*)     Glucose 114 (*)     All other components within normal limits   HIV 1 / 2 ANTIBODY    Narrative:     Release to patient->Immediate   HEPATITIS C ANTIBODY    Narrative:     Release to patient->Immediate   C-REACTIVE PROTEIN          Imaging Results               US Chest Mediastinum (Final result)  Result time 06/12/24 14:33:15      Final result by Dereck Matthew MD (06/12/24 14:33:15)                   Impression:      Complex fluid collections along the implant, favoring infectious process.  Recommend follow-up diagnostic breast ultrasound after completion of treatment.    This report was flagged in Epic as abnormal.      Electronically signed by: Dereck Matthew MD  Date:    06/12/2024  Time:    14:33               Narrative:    EXAMINATION:  US CHEST MEDIASTINUM    CLINICAL HISTORY:  right breast abscess;    TECHNIQUE:  Chest wall ultrasound    COMPARISON:  None    FINDINGS:  Status post breast reconstruction with implants in February 2024. Complex fluid noted  along the implant, measuring 5.0 x 0.4 x 3.8 cm.  Additional collection appears slightly more lateral, measuring 2.2 x 2.1 x 2.5 cm, perhaps contiguous.                                       Medications   vancomycin 1,250 mg in dextrose 5 % (D5W) 250 mL IVPB (Vial-Mate) (0 mg Intravenous Stopped 6/14/24 1342)   vancomycin 1,250 mg in dextrose 5 % (D5W) 250 mL IVPB (Vial-Mate) (0 mg Intravenous Stopped 6/12/24 1430)     Medical Decision Making  DDX:  breast abscess, seroma, cellulitis    Patient admitted to plastic surgery, iv abx ordered, US pending    Amount and/or Complexity of Data Reviewed  Labs: ordered.  Radiology: ordered.    Risk  Decision regarding hospitalization.                                      Clinical Impression:  Final diagnoses:  [N61.1] Abscess of right breast  [N61.0] Cellulitis of right breast          ED Disposition Condition    Admit                 Doreen Schmidt MD  06/17/24 4599

## 2024-06-12 NOTE — H&P
Plastic and Reconstructive Surgery   Consult Note    Date of Consultation:   2024    History of Present Illness:  The patient is a 59-year-old female well known to the Plastic surgery Service.  The patient has a history of bilateral breast reconstruction with implants.  The patient presents to the clinic today with concern for an infected right implant.  The patient states that on Saturday the implant became more red firm and painful.  The patient states that she has had some draining from the site as well.  Patient was sent to the emergency department to undergo an ultrasound in the admitted    Past Medical History:    has a past medical history of Adrenal benign neoplasm, Breast carcinoma, COPD (chronic obstructive pulmonary disease), HTN (hypertension), benign, DAQUAN on CPAP, Osteoporosis, Panic disorder, and Vitamin D deficiency.    Past Surgical History:    has a past surgical history that includes Gastric bypass (); Gastric Bypass Reversal (); Breast surgery (Bilateral, 2012); Cholecystectomy (); Total abdominal hysterectomy w/ bilateral salpingoophorectomy (); Hysterectomy; and Breast reconstruction (Bilateral, 2024).    Social History:  Social History     Tobacco Use    Smoking status: Former     Current packs/day: 0.00     Average packs/day: 1 pack/day for 30.0 years (30.0 ttl pk-yrs)     Types: Cigarettes     Start date: 11/3/1979     Quit date: 11/3/2009     Years since quittin.6    Smokeless tobacco: Not on file   Substance Use Topics    Alcohol use: Not Currently     Social History     Substance and Sexual Activity   Drug Use Never       Family History:  Family History   Problem Relation Name Age of Onset    Ovarian cancer Mother      Uterine cancer Mother      COPD Brother      Breast cancer Maternal Grandmother      COPD Maternal Grandfather      Breast cancer Paternal Grandmother      Lung cancer Paternal Grandfather      Breast cancer Maternal Aunt          Allergies:  Review of patient's allergies indicates:  No Known Allergies    Home Medications:  Prior to Admission medications    Medication Sig Start Date End Date Taking? Authorizing Provider   lisinopril-hydrochlorothiazide (PRINZIDE,ZESTORETIC) 20-12.5 mg per tablet Take 1 tablet by mouth once daily. 12/6/19  Yes Provider, Historical   omeprazole (PRILOSEC) 20 MG capsule Take 20 mg by mouth once daily. 12/4/19  Yes Provider, Historical   promethazine (PHENERGAN) 25 MG tablet Take 25 mg by mouth.   Yes Provider, Historical   albuterol (PROVENTIL/VENTOLIN HFA) 90 mcg/actuation inhaler Inhale 2 puffs into the lungs.    Provider, Historical   albuterol (PROVENTIL/VENTOLIN HFA) 90 mcg/actuation inhaler  2/1/20   Provider, Historical   alendronate (FOSAMAX) 70 MG tablet TAKE ONE TABLET BY MOUTH EVERY WEEK AS DIRECTED 1/17/20   Provider, Historical   ALPRAZolam (XANAX) 0.5 MG tablet Take 0.5 mg by mouth.    Provider, Historical   amLODIPine (NORVASC) 5 MG tablet Take 5 mg by mouth once daily. 12/7/19   Provider, Historical   busPIRone (BUSPAR) 7.5 MG tablet Take 7.5 mg by mouth 3 (three) times daily.    Provider, Historical   cholecalciferol, vitamin D3, 1,250 mcg (50,000 unit) capsule Take 50,000 Units by mouth every 7 days. 1/9/20   Provider, Historical   clobetasol 0.05% (TEMOVATE) 0.05 % Oint APPLY OINTMENT TOPICALLY TO AFFECTED AREA AS NEEDED 3/31/22   Bárbara Amin, [facility]   ergocalciferol (ERGOCALCIFEROL) 50,000 unit Cap Take 50,000 Units by mouth. 12/23/15   Provider, Historical   escitalopram oxalate (LEXAPRO) 20 MG tablet Take 20 mg by mouth once daily. 12/12/19   Provider, Historical   gabapentin (NEURONTIN) 300 MG capsule Take 300 mg by mouth 3 (three) times daily. 1/7/20   Provider, Historical   INCRUSE ELLIPTA 62.5 mcg/actuation DsDv Take by mouth once daily. 1/9/20   Provider, Historical   linaCLOtide (LINZESS) 145 mcg Cap capsule Take 145 mcg by mouth.    Provider, Historical   LORazepam (ATIVAN)  "0.5 MG tablet TAKE 1 TABLET BY MOUTH TWICE DAILY. MUST SEE DOCTOR FOR FURTHER REFILLS 20   Provider, Historical   NON FORMULARY MEDICATION CPAP    Provider, Historical   oxyCODONE (ROXICODONE) 5 MG immediate release tablet Take 1 tablet (5 mg total) by mouth every 4 (four) hours as needed for Pain. 24   Hellen Rodgers MD   traMADol (ULTRAM) 50 mg tablet Take 50 mg by mouth 3 (three) times daily as needed. 20   Provider, Historical       Review of Systems:  Negative except for what is noted in HPI    Physical Exam:  VITAL SIGNS:   Vitals:    24 1048   BP: 105/60   BP Location: Right arm   Patient Position: Sitting   Pulse: 62   Resp: 19   Temp: 97.9 °F (36.6 °C)   TempSrc: Oral   SpO2: 98%   Weight: 81.2 kg (179 lb)   Height: 4' 11" (1.499 m)     TMAX: Temp (24hrs), Av.9 °F (36.6 °C), Min:97.9 °F (36.6 °C), Max:97.9 °F (36.6 °C)    General: Alert; No acute distress  Cardiovascular: Regular rate   Breasts:  Erythematous indurated and painful right breast, not able to express any fluid  Respiratory: Normal respiratory effort. Chest rise symmetric.   Abdomen: Soft, nontender, nondistended  Extremity: Moves all extremities equally.  Neurologic: No focal deficit. Speech normal         Diagnostic Data:  No results found for this or any previous visit (from the past 336 hour(s)).  No results found for this or any previous visit (from the past 336 hour(s)).  No results found for: "ALBUMIN"  No results found for: "CRP"  No results found for: "INR", "PROTIME"  No results found for: "PTT"    Microbiology Results (last 7 days)       ** No results found for the last 168 hours. **            Assessment:  59 y.o.female with concern for an infected right implant    Plan:  - admitted to the hospital   Ultrasound of the right breast to be ordered   Broad-spectrum antibiotics it   Follow up basic labs   Regular diet, NPO at midnight   IV fluids   Lovenox   Possibly planning for the OR tomorrow depending on " with the ultrasound and laboratory values show.      Jayson Mcdonald MD  Plastic Surgery Fellow

## 2024-06-12 NOTE — PROGRESS NOTES
"Pharmacokinetic Initial Assessment: IV Vancomycin    Assessment/Plan:    Initiate intravenous vancomycin with loading dose of 1250 mg ED 1 time followed by a maintenance dose of vancomycin 1250 mg IV every 24 hours.  Desired empiric serum trough concentration is 10 to 20 mcg/mL.  Draw vancomycin trough level 60 min prior to third dose on 6/14/24 at approximately 1100.  Pharmacy will continue to follow and monitor vancomycin.      Please contact pharmacy at extension [phone] with any questions regarding this assessment.     Thank you for the consult,   Sue De La Cruz       Patient brief summary:  Deanna Mcclain is a 59 y.o. female initiated on antimicrobial therapy with IV Vancomycin for treatment of suspected skin & soft tissue infection.    Drug Allergies:   Review of patient's allergies indicates:  No Known Allergies    Actual Body Weight:   81.2 kg    Renal Function:   Estimated Creatinine Clearance: 59.8 mL/min (based on SCr of 1 mg/dL).,     Dialysis Method (if applicable):  N/A    CBC (last 72 hours):  Recent Labs   Lab Result Units 06/12/24  1139   WBC K/uL 7.61   Hemoglobin g/dL 15.2   Hematocrit % 44.4   Platelets K/uL 220   Gran % % 71.8   Lymph % % 17.0*   Mono % % 7.2   Eosinophil % % 2.4   Basophil % % 1.3   Differential Method  Automated       Metabolic Panel (last 72 hours):  Recent Labs   Lab Result Units 06/12/24  1139   Sodium mmol/L 141   Potassium mmol/L 4.3   Chloride mmol/L 108   CO2 mmol/L 21*   Glucose mg/dL 98   BUN mg/dL 16   Creatinine mg/dL 1.0   Albumin g/dL 3.4*   Total Bilirubin mg/dL 0.4   Alkaline Phosphatase U/L 94   AST U/L 14   ALT U/L 21       Drug levels (last 3 results):  No results for input(s): "VANCOMYCINRA", "VANCORANDOM", "VANCOMYCINPE", "VANCOPEAK", "VANCOMYCINTR", "VANCOTROUGH" in the last 72 hours.    Microbiologic Results:  Microbiology Results (last 7 days)       ** No results found for the last 168 hours. **            "

## 2024-06-12 NOTE — ED TRIAGE NOTES
Deanna Mcclain, a 59 y.o. female presents to the ED w/ complaint of right sided breast pain, rejection since Feb 29th, just left PCP told to come to ER for infection    Triage note:  Chief Complaint   Patient presents with    Post-op Problem     Right sided breast implant infection ; breast reconstruction from Breast CA     Review of patient's allergies indicates:  No Known Allergies  Past Medical History:   Diagnosis Date    Adrenal benign neoplasm     bilateral, left 9 mm right 11 mm on CT 5-2015    Breast carcinoma     COPD (chronic obstructive pulmonary disease)     HTN (hypertension), benign     DAQUAN on CPAP     Osteoporosis     Panic disorder     Vitamin D deficiency          APPEARANCE: awake and alert in NAD. PAIN  9/10  SKIN: warm, dry and intact. No breakdown or bruising.  MUSCULOSKELETAL: Patient moving all extremities spontaneously, no obvious swelling or deformities noted. Ambulates independently.  RESPIRATORY: Denies shortness of breath.Respirations unlabored.   CARDIAC: Denies CP, 2+ distal pulses; no peripheral edema  ABDOMEN: S/ND/NT, Denies nausea  : voids spontaneously, denies difficulty  Neurologic: AAO x 4; follows commands equal strength in all extremities; denies numbness/tingling. Denies dizziness

## 2024-06-12 NOTE — ED NOTES
Patient identifiers verified and correct for Deanna Mcclain.  LOC: The patient is awake, alert and aware of environment with an appropriate affect, the patient is oriented x 3 and speaking appropriately.   APPEARANCE: Patient appears comfortable and in no acute distress, patient is clean and well groomed.  SKIN: The skin is warm and dry, color consistent with ethnicity, patient has normal skin turgor and moist mucus membranes, redness and swelling present to right breast.  MUSCULOSKELETAL: Patient moving all extremities spontaneously, no swelling noted.  RESPIRATORY: Airway is open and patent, respirations are spontaneous, patient has a normal effort and rate, no accessory muscle use noted, pt placed on continuous pulse ox with O2 sats noted at 98% on room air.  CARDIAC: Pt placed on cardiac monitor. Patient bradycardic, no edema noted, capillary refill < 3 seconds.   GASTRO: Soft and non tender to palpation, no distention noted, normoactive bowel sounds present in all four quadrants. Pt states bowel movements have been regular.  : Pt denies any pain or frequency with urination.  NEURO: Pt opens eyes spontaneously, behavior appropriate to situation, follows commands, facial expression symmetrical, bilateral hand grasp equal and even, purposeful motor response noted, normal sensation in all extremities when touched with a finger.    HEENT: blister to lower lip

## 2024-06-13 ENCOUNTER — ANESTHESIA (OUTPATIENT)
Dept: SURGERY | Facility: HOSPITAL | Age: 59
End: 2024-06-13
Payer: MEDICAID

## 2024-06-13 PROBLEM — T85.9XXA COMPLICATION OF BREAST IMPLANT: Status: ACTIVE | Noted: 2024-06-13

## 2024-06-13 LAB
ANION GAP SERPL CALC-SCNC: 8 MMOL/L (ref 8–16)
BASOPHILS # BLD AUTO: 0.09 K/UL (ref 0–0.2)
BASOPHILS NFR BLD: 1.4 % (ref 0–1.9)
BUN SERPL-MCNC: 15 MG/DL (ref 6–20)
CALCIUM SERPL-MCNC: 9 MG/DL (ref 8.7–10.5)
CHLORIDE SERPL-SCNC: 110 MMOL/L (ref 95–110)
CO2 SERPL-SCNC: 20 MMOL/L (ref 23–29)
CREAT SERPL-MCNC: 0.8 MG/DL (ref 0.5–1.4)
DIFFERENTIAL METHOD BLD: ABNORMAL
EOSINOPHIL # BLD AUTO: 0.2 K/UL (ref 0–0.5)
EOSINOPHIL NFR BLD: 3.6 % (ref 0–8)
ERYTHROCYTE [DISTWIDTH] IN BLOOD BY AUTOMATED COUNT: 12.3 % (ref 11.5–14.5)
EST. GFR  (NO RACE VARIABLE): >60 ML/MIN/1.73 M^2
GLUCOSE SERPL-MCNC: 114 MG/DL (ref 70–110)
GRAM STN SPEC: NORMAL
HCT VFR BLD AUTO: 40 % (ref 37–48.5)
HGB BLD-MCNC: 13.1 G/DL (ref 12–16)
IMM GRANULOCYTES # BLD AUTO: 0.02 K/UL (ref 0–0.04)
IMM GRANULOCYTES NFR BLD AUTO: 0.3 % (ref 0–0.5)
LYMPHOCYTES # BLD AUTO: 1.8 K/UL (ref 1–4.8)
LYMPHOCYTES NFR BLD: 27.1 % (ref 18–48)
MCH RBC QN AUTO: 31.8 PG (ref 27–31)
MCHC RBC AUTO-ENTMCNC: 32.8 G/DL (ref 32–36)
MCV RBC AUTO: 97 FL (ref 82–98)
MONOCYTES # BLD AUTO: 0.7 K/UL (ref 0.3–1)
MONOCYTES NFR BLD: 10.5 % (ref 4–15)
NEUTROPHILS # BLD AUTO: 3.7 K/UL (ref 1.8–7.7)
NEUTROPHILS NFR BLD: 57.1 % (ref 38–73)
NRBC BLD-RTO: 0 /100 WBC
PLATELET # BLD AUTO: 215 K/UL (ref 150–450)
PMV BLD AUTO: 10.5 FL (ref 9.2–12.9)
POTASSIUM SERPL-SCNC: 4.4 MMOL/L (ref 3.5–5.1)
RBC # BLD AUTO: 4.12 M/UL (ref 4–5.4)
SODIUM SERPL-SCNC: 138 MMOL/L (ref 136–145)
WBC # BLD AUTO: 6.45 K/UL (ref 3.9–12.7)

## 2024-06-13 PROCEDURE — 25000003 PHARM REV CODE 250: Performed by: NURSE ANESTHETIST, CERTIFIED REGISTERED

## 2024-06-13 PROCEDURE — 11000001 HC ACUTE MED/SURG PRIVATE ROOM

## 2024-06-13 PROCEDURE — 0HPT0NZ REMOVAL OF TISSUE EXPANDER FROM RIGHT BREAST, OPEN APPROACH: ICD-10-PCS | Performed by: SURGERY

## 2024-06-13 PROCEDURE — 87076 CULTURE ANAEROBE IDENT EACH: CPT | Performed by: SURGERY

## 2024-06-13 PROCEDURE — C1789 PROSTHESIS, BREAST, IMP: HCPCS | Performed by: SURGERY

## 2024-06-13 PROCEDURE — 25000003 PHARM REV CODE 250: Performed by: ANESTHESIOLOGY

## 2024-06-13 PROCEDURE — 37000009 HC ANESTHESIA EA ADD 15 MINS: Performed by: SURGERY

## 2024-06-13 PROCEDURE — 36000707: Performed by: SURGERY

## 2024-06-13 PROCEDURE — 71000033 HC RECOVERY, INTIAL HOUR: Performed by: SURGERY

## 2024-06-13 PROCEDURE — 87116 MYCOBACTERIA CULTURE: CPT | Performed by: SURGERY

## 2024-06-13 PROCEDURE — 25000003 PHARM REV CODE 250: Performed by: STUDENT IN AN ORGANIZED HEALTH CARE EDUCATION/TRAINING PROGRAM

## 2024-06-13 PROCEDURE — 71000015 HC POSTOP RECOV 1ST HR: Performed by: SURGERY

## 2024-06-13 PROCEDURE — 63600175 PHARM REV CODE 636 W HCPCS: Performed by: STUDENT IN AN ORGANIZED HEALTH CARE EDUCATION/TRAINING PROGRAM

## 2024-06-13 PROCEDURE — 87205 SMEAR GRAM STAIN: CPT | Mod: 59 | Performed by: SURGERY

## 2024-06-13 PROCEDURE — 87070 CULTURE OTHR SPECIMN AEROBIC: CPT | Performed by: SURGERY

## 2024-06-13 PROCEDURE — C1729 CATH, DRAINAGE: HCPCS | Performed by: SURGERY

## 2024-06-13 PROCEDURE — 37000008 HC ANESTHESIA 1ST 15 MINUTES: Performed by: SURGERY

## 2024-06-13 PROCEDURE — 85025 COMPLETE CBC W/AUTO DIFF WBC: CPT | Performed by: STUDENT IN AN ORGANIZED HEALTH CARE EDUCATION/TRAINING PROGRAM

## 2024-06-13 PROCEDURE — 63600175 PHARM REV CODE 636 W HCPCS: Performed by: ANESTHESIOLOGY

## 2024-06-13 PROCEDURE — 36000704 HC OR TIME LEV I 1ST 15 MIN: Performed by: SURGERY

## 2024-06-13 PROCEDURE — 27201423 OPTIME MED/SURG SUP & DEVICES STERILE SUPPLY: Performed by: SURGERY

## 2024-06-13 PROCEDURE — 87102 FUNGUS ISOLATION CULTURE: CPT | Performed by: SURGERY

## 2024-06-13 PROCEDURE — 63600175 PHARM REV CODE 636 W HCPCS: Performed by: NURSE ANESTHETIST, CERTIFIED REGISTERED

## 2024-06-13 PROCEDURE — 63600175 PHARM REV CODE 636 W HCPCS: Performed by: SURGERY

## 2024-06-13 PROCEDURE — 25000003 PHARM REV CODE 250

## 2024-06-13 PROCEDURE — 87075 CULTR BACTERIA EXCEPT BLOOD: CPT | Performed by: SURGERY

## 2024-06-13 PROCEDURE — 80048 BASIC METABOLIC PNL TOTAL CA: CPT | Performed by: STUDENT IN AN ORGANIZED HEALTH CARE EDUCATION/TRAINING PROGRAM

## 2024-06-13 PROCEDURE — 36000705 HC OR TIME LEV I EA ADD 15 MIN: Performed by: SURGERY

## 2024-06-13 PROCEDURE — 71000016 HC POSTOP RECOV ADDL HR: Performed by: SURGERY

## 2024-06-13 PROCEDURE — 0HRT0JZ REPLACEMENT OF RIGHT BREAST WITH SYNTHETIC SUBSTITUTE, OPEN APPROACH: ICD-10-PCS | Performed by: SURGERY

## 2024-06-13 PROCEDURE — 36000706: Performed by: SURGERY

## 2024-06-13 PROCEDURE — 87206 SMEAR FLUORESCENT/ACID STAI: CPT | Performed by: SURGERY

## 2024-06-13 PROCEDURE — 94761 N-INVAS EAR/PLS OXIMETRY MLT: CPT

## 2024-06-13 DEVICE — IMPLANTABLE DEVICE: Type: IMPLANTABLE DEVICE | Site: BREAST | Status: FUNCTIONAL

## 2024-06-13 RX ORDER — ROCURONIUM BROMIDE 10 MG/ML
INJECTION, SOLUTION INTRAVENOUS
Status: DISCONTINUED | OUTPATIENT
Start: 2024-06-13 | End: 2024-06-13

## 2024-06-13 RX ORDER — HYDROMORPHONE HYDROCHLORIDE 1 MG/ML
0.2 INJECTION, SOLUTION INTRAMUSCULAR; INTRAVENOUS; SUBCUTANEOUS EVERY 10 MIN PRN
Status: DISCONTINUED | OUTPATIENT
Start: 2024-06-13 | End: 2024-06-13 | Stop reason: HOSPADM

## 2024-06-13 RX ORDER — KETOROLAC TROMETHAMINE 15 MG/ML
15 INJECTION, SOLUTION INTRAMUSCULAR; INTRAVENOUS EVERY 8 HOURS PRN
Status: DISCONTINUED | OUTPATIENT
Start: 2024-06-13 | End: 2024-06-13 | Stop reason: HOSPADM

## 2024-06-13 RX ORDER — DEXAMETHASONE SODIUM PHOSPHATE 4 MG/ML
INJECTION, SOLUTION INTRA-ARTICULAR; INTRALESIONAL; INTRAMUSCULAR; INTRAVENOUS; SOFT TISSUE
Status: DISCONTINUED | OUTPATIENT
Start: 2024-06-13 | End: 2024-06-13

## 2024-06-13 RX ORDER — ACETAMINOPHEN 10 MG/ML
INJECTION, SOLUTION INTRAVENOUS
Status: DISCONTINUED | OUTPATIENT
Start: 2024-06-13 | End: 2024-06-13

## 2024-06-13 RX ORDER — MIDAZOLAM HYDROCHLORIDE 1 MG/ML
INJECTION INTRAMUSCULAR; INTRAVENOUS
Status: DISCONTINUED | OUTPATIENT
Start: 2024-06-13 | End: 2024-06-13

## 2024-06-13 RX ORDER — PROPOFOL 10 MG/ML
VIAL (ML) INTRAVENOUS
Status: DISCONTINUED | OUTPATIENT
Start: 2024-06-13 | End: 2024-06-13

## 2024-06-13 RX ORDER — KETAMINE HCL IN 0.9 % NACL 50 MG/5 ML
SYRINGE (ML) INTRAVENOUS
Status: DISCONTINUED | OUTPATIENT
Start: 2024-06-13 | End: 2024-06-13

## 2024-06-13 RX ORDER — OXYCODONE AND ACETAMINOPHEN 5; 325 MG/1; MG/1
1 TABLET ORAL
Status: DISCONTINUED | OUTPATIENT
Start: 2024-06-13 | End: 2024-06-13 | Stop reason: HOSPADM

## 2024-06-13 RX ORDER — HALOPERIDOL 5 MG/ML
0.5 INJECTION INTRAMUSCULAR EVERY 10 MIN PRN
Status: DISCONTINUED | OUTPATIENT
Start: 2024-06-13 | End: 2024-06-13 | Stop reason: HOSPADM

## 2024-06-13 RX ORDER — VANCOMYCIN HYDROCHLORIDE 1 G/20ML
INJECTION, POWDER, LYOPHILIZED, FOR SOLUTION INTRAVENOUS
Status: DISCONTINUED | OUTPATIENT
Start: 2024-06-13 | End: 2024-06-13

## 2024-06-13 RX ORDER — EPHEDRINE SULFATE 50 MG/ML
INJECTION, SOLUTION INTRAVENOUS
Status: DISCONTINUED | OUTPATIENT
Start: 2024-06-13 | End: 2024-06-13

## 2024-06-13 RX ORDER — LIDOCAINE HYDROCHLORIDE 20 MG/ML
INJECTION, SOLUTION EPIDURAL; INFILTRATION; INTRACAUDAL; PERINEURAL
Status: DISCONTINUED | OUTPATIENT
Start: 2024-06-13 | End: 2024-06-13

## 2024-06-13 RX ORDER — MUPIROCIN 20 MG/G
OINTMENT TOPICAL 2 TIMES DAILY
Status: DISCONTINUED | OUTPATIENT
Start: 2024-06-13 | End: 2024-06-15 | Stop reason: HOSPADM

## 2024-06-13 RX ADMIN — SODIUM CHLORIDE, POTASSIUM CHLORIDE, SODIUM LACTATE AND CALCIUM CHLORIDE: 600; 310; 30; 20 INJECTION, SOLUTION INTRAVENOUS at 09:06

## 2024-06-13 RX ADMIN — HYDROMORPHONE HYDROCHLORIDE 0.2 MG: 1 INJECTION, SOLUTION INTRAMUSCULAR; INTRAVENOUS; SUBCUTANEOUS at 04:06

## 2024-06-13 RX ADMIN — ONDANSETRON 4 MG: 2 INJECTION INTRAMUSCULAR; INTRAVENOUS at 03:06

## 2024-06-13 RX ADMIN — EPHEDRINE SULFATE 5 MG: 50 INJECTION INTRAVENOUS at 02:06

## 2024-06-13 RX ADMIN — SODIUM CHLORIDE: 0.9 INJECTION, SOLUTION INTRAVENOUS at 12:06

## 2024-06-13 RX ADMIN — ACETAMINOPHEN 1000 MG: 10 INJECTION, SOLUTION INTRAVENOUS at 02:06

## 2024-06-13 RX ADMIN — HYDROMORPHONE HYDROCHLORIDE 1 MG: 1 INJECTION, SOLUTION INTRAMUSCULAR; INTRAVENOUS; SUBCUTANEOUS at 08:06

## 2024-06-13 RX ADMIN — MIDAZOLAM 1 MG: 1 INJECTION INTRAMUSCULAR; INTRAVENOUS at 12:06

## 2024-06-13 RX ADMIN — MUPIROCIN: 20 OINTMENT TOPICAL at 09:06

## 2024-06-13 RX ADMIN — Medication 15 MG: at 12:06

## 2024-06-13 RX ADMIN — SODIUM CHLORIDE, POTASSIUM CHLORIDE, SODIUM LACTATE AND CALCIUM CHLORIDE: 600; 310; 30; 20 INJECTION, SOLUTION INTRAVENOUS at 05:06

## 2024-06-13 RX ADMIN — DEXAMETHASONE SODIUM PHOSPHATE 8 MG: 4 INJECTION INTRA-ARTICULAR; INTRALESIONAL; INTRAMUSCULAR; INTRAVENOUS; SOFT TISSUE at 01:06

## 2024-06-13 RX ADMIN — ALPRAZOLAM 0.5 MG: 0.5 TABLET ORAL at 10:06

## 2024-06-13 RX ADMIN — SUGAMMADEX 200 MG: 100 INJECTION, SOLUTION INTRAVENOUS at 03:06

## 2024-06-13 RX ADMIN — SODIUM CHLORIDE, POTASSIUM CHLORIDE, SODIUM LACTATE AND CALCIUM CHLORIDE: 600; 310; 30; 20 INJECTION, SOLUTION INTRAVENOUS at 04:06

## 2024-06-13 RX ADMIN — OXYCODONE 5 MG: 5 TABLET ORAL at 05:06

## 2024-06-13 RX ADMIN — LIDOCAINE HYDROCHLORIDE 100 MG: 20 INJECTION, SOLUTION EPIDURAL; INFILTRATION; INTRACAUDAL at 12:06

## 2024-06-13 RX ADMIN — ENOXAPARIN SODIUM 40 MG: 40 INJECTION SUBCUTANEOUS at 05:06

## 2024-06-13 RX ADMIN — VANCOMYCIN HYDROCHLORIDE 1 G: 1 INJECTION, POWDER, LYOPHILIZED, FOR SOLUTION INTRAVENOUS at 12:06

## 2024-06-13 RX ADMIN — ROCURONIUM BROMIDE 20 MG: 10 INJECTION, SOLUTION INTRAVENOUS at 01:06

## 2024-06-13 RX ADMIN — ROCURONIUM BROMIDE 60 MG: 10 INJECTION, SOLUTION INTRAVENOUS at 12:06

## 2024-06-13 RX ADMIN — FENTANYL CITRATE 50 MCG: 50 INJECTION INTRAMUSCULAR; INTRAVENOUS at 02:06

## 2024-06-13 RX ADMIN — PROPOFOL 150 MG: 10 INJECTION, EMULSION INTRAVENOUS at 12:06

## 2024-06-13 NOTE — OP NOTE
Date of surgery 06/13/2024   Preoperative diagnosis cellulitis of right reconstructed breast (implant with AlloDerm)  Postoperative diagnosis is same   Procedure performed  1. Right breast exploration   2. Implant exchange  3. Extensive capsulotomy right breast  4. Removal of segmental AlloDerm  Surgeon zayda  Anesthesia general   Complications none   Blood loss 100 cc   Drains x1       The patient had a bilateral breast reconstruction using implants wrapped in AlloDerm.  The patient had no previous problems with the left breast the right side had intermittent erythema.  Patient came to the clinic yesterday with a red breast.  Patient's white count yesterday was 7.  Patient had no fever or chills.  The breasts however was very erythematous.  Patient was started on IV antibiotics.    Patient was taken to the operative room placed in supine position after adequate general anesthesia was prepped and draped in a normal sterile fashion.  The previous mastectomy incision was opened.  There was no purulent material whatsoever in the breasts.  Was a little bit of serous fluid but no infected fluid.  There was noted to be a portion of the AlloDerm which was not adherent.  This was removed.  All of the other AlloDerm was completely incorporated.  Thus, with no sign of any infection inside we still irrigated with copious amounts of Betadine peroxide and Vashe solution.  All surfaces were also scrubbed using Hibiclens scrub brushes.  Hemostasis was achieved using the Bovie.  Temporary sizers were used and noted that is very difficult to fit a 650 cc implant in.  The incision would be too tight.  Thus extensive capsulotomy is were formed laterally and superiorly temporary sizers were then used.  A 585 cc smooth silicone implant was then placed.  A drain was placed.  The incisions were closed using interrupted 3-0 Monocryl followed by running 4-0 Monocryl subcuticular suture.  Patient will be admitted for further antibiotics.

## 2024-06-13 NOTE — ED NOTES
Patient identifiers for Deanna Mcclain 59 y.o. female checked and correct.  Chief Complaint   Patient presents with    Post-op Problem     Right sided breast implant infection ; breast reconstruction from Breast CA     Past Medical History:   Diagnosis Date    Adrenal benign neoplasm     bilateral, left 9 mm right 11 mm on CT 5-2015    Breast carcinoma     COPD (chronic obstructive pulmonary disease)     HTN (hypertension), benign     DAQUAN on CPAP     Osteoporosis     Panic disorder     Vitamin D deficiency      Allergies reported: Review of patient's allergies indicates:  No Known Allergies      LOC: Patient is awake, alert, and aware of environment with an appropriate affect. Patient is oriented x 4 and speaking appropriately.  APPEARANCE: Patient resting comfortably and in no acute distress. Patient is clean and well groomed, patient's clothing is properly fastened.  SKIN: The skin is warm and dry. Patient has normal skin turgor and moist mucus membranes. Redness/swelling to right breast  MUSKULOSKELETAL: Patient is moving all extremities well, no obvious deformities noted. Pulses intact.   RESPIRATORY: Airway is open and patent. Respirations are spontaneous and non-labored with normal effort and rate.  CARDIAC: Patient has a normal rate and rhythm. Normal sinus on cardiac monitor. No peripheral edema noted.   ABDOMEN: No distention noted. Soft and non-tender upon palpation.  NEUROLOGICAL: PERRL. Facial expression is symmetrical. Hand grasps are equal bilaterally. Normal sensation in all extremities when touched with finger.

## 2024-06-13 NOTE — PROGRESS NOTES
[facility] Dept  Plastic Surgery  Progress Note    Subjective:     History of Present Illness:  No notes on file    Post-Op Info:  Procedure(s) (LRB):  WASHOUT right breast, removal of implant, possible replacement (Right)         Interval History: No acute events overnight, plan for OR today     Medications:  Continuous Infusions:   lactated ringers   Intravenous Continuous 75 mL/hr at 06/13/24 0509 New Bag at 06/13/24 0509     Scheduled Meds:   enoxparin  40 mg Subcutaneous Q24H (prophylaxis, 1700)    pantoprazole  40 mg Oral Daily    polyethylene glycol  17 g Oral Daily    vancomycin (VANCOCIN) IV (PEDS and ADULTS)  1,250 mg Intravenous Q24H     PRN Meds:  Current Facility-Administered Medications:     acetaminophen, 650 mg, Oral, Q4H PRN    albuterol-ipratropium, 3 mL, Nebulization, Q6H PRN    ALPRAZolam, 0.5 mg, Oral, BID PRN    HYDROmorphone, 1 mg, Intravenous, Q4H PRN    ondansetron, 4 mg, Intravenous, Q8H PRN    oxyCODONE, 5 mg, Oral, Q4H PRN    prochlorperazine, 5 mg, Intravenous, Q6H PRN    sodium chloride 0.9%, 10 mL, Intravenous, PRN    Pharmacy to dose Vancomycin consult, , , Once **AND** vancomycin - pharmacy to dose, , Intravenous, pharmacy to manage frequency     Review of patient's allergies indicates:  No Known Allergies  Objective:     Vital Signs (Most Recent):  Temp: 98 °F (36.7 °C) (06/13/24 0510)  Pulse: 64 (06/13/24 0512)  Resp: 18 (06/12/24 2332)  BP: (!) 116/58 (06/13/24 0510)  SpO2: 100 % (06/13/24 0512) Vital Signs (24h Range):  Temp:  [97.9 °F (36.6 °C)-98.4 °F (36.9 °C)] 98 °F (36.7 °C)  Pulse:  [55-67] 64  Resp:  [16-19] 18  SpO2:  [98 %-100 %] 100 %  BP: (101-136)/(51-79) 116/58     Weight: 81.2 kg (179 lb)  Body mass index is 36.15 kg/m².    Intake/Output - Last 3 Shifts         06/11 0700 06/12 0659 06/12 0700 06/13 0659 06/13 0700 06/14 0659    IV Piggyback  248.2     Total Intake(mL/kg)  248.2 (3.1)     Net  +248.2                     Physical Exam  Constitutional:        Appearance: Normal appearance.   HENT:      Head: Normocephalic and atraumatic.      Mouth/Throat:      Mouth: Mucous membranes are moist.   Cardiovascular:      Rate and Rhythm: Regular rhythm.   Pulmonary:      Effort: Pulmonary effort is normal. No respiratory distress.   Abdominal:      Palpations: Abdomen is soft.   Skin:     General: Skin is warm.   Neurological:      General: No focal deficit present.      Mental Status: She is alert and oriented to person, place, and time. Mental status is at baseline.   Psychiatric:         Mood and Affect: Mood normal.         Behavior: Behavior normal.         Thought Content: Thought content normal.          Significant Labs:  I have reviewed all pertinent lab results within the past 24 hours.    Significant Diagnostics:  I have reviewed all pertinent imaging results/findings within the past 24 hours.  Assessment/Plan:     Complication of breast implant  59 y.o.female with concern for an infected right implant     - OR today for right breast implant washout removal, possible replacement implant   - Informed consent obtained   - NPO for procedure         Alireza Claire MD  Plastic Surgery  [facility] - [facility]

## 2024-06-13 NOTE — SUBJECTIVE & OBJECTIVE
Interval History: No acute events overnight, plan for OR today     Medications:  Continuous Infusions:   lactated ringers   Intravenous Continuous 75 mL/hr at 06/13/24 0509 New Bag at 06/13/24 0509     Scheduled Meds:   enoxparin  40 mg Subcutaneous Q24H (prophylaxis, 1700)    pantoprazole  40 mg Oral Daily    polyethylene glycol  17 g Oral Daily    vancomycin (VANCOCIN) IV (PEDS and ADULTS)  1,250 mg Intravenous Q24H     PRN Meds:  Current Facility-Administered Medications:     acetaminophen, 650 mg, Oral, Q4H PRN    albuterol-ipratropium, 3 mL, Nebulization, Q6H PRN    ALPRAZolam, 0.5 mg, Oral, BID PRN    HYDROmorphone, 1 mg, Intravenous, Q4H PRN    ondansetron, 4 mg, Intravenous, Q8H PRN    oxyCODONE, 5 mg, Oral, Q4H PRN    prochlorperazine, 5 mg, Intravenous, Q6H PRN    sodium chloride 0.9%, 10 mL, Intravenous, PRN    Pharmacy to dose Vancomycin consult, , , Once **AND** vancomycin - pharmacy to dose, , Intravenous, pharmacy to manage frequency     Review of patient's allergies indicates:  No Known Allergies  Objective:     Vital Signs (Most Recent):  Temp: 98 °F (36.7 °C) (06/13/24 0510)  Pulse: 64 (06/13/24 0512)  Resp: 18 (06/12/24 2332)  BP: (!) 116/58 (06/13/24 0510)  SpO2: 100 % (06/13/24 0512) Vital Signs (24h Range):  Temp:  [97.9 °F (36.6 °C)-98.4 °F (36.9 °C)] 98 °F (36.7 °C)  Pulse:  [55-67] 64  Resp:  [16-19] 18  SpO2:  [98 %-100 %] 100 %  BP: (101-136)/(51-79) 116/58     Weight: 81.2 kg (179 lb)  Body mass index is 36.15 kg/m².    Intake/Output - Last 3 Shifts         06/11 0700 06/12 0659 06/12 0700 06/13 0659 06/13 0700 06/14 0659    IV Piggyback  248.2     Total Intake(mL/kg)  248.2 (3.1)     Net  +248.2                     Physical Exam  Constitutional:       Appearance: Normal appearance.   HENT:      Head: Normocephalic and atraumatic.      Mouth/Throat:      Mouth: Mucous membranes are moist.   Cardiovascular:      Rate and Rhythm: Regular rhythm.   Pulmonary:      Effort: Pulmonary effort  is normal. No respiratory distress.   Abdominal:      Palpations: Abdomen is soft.   Skin:     General: Skin is warm.   Neurological:      General: No focal deficit present.      Mental Status: She is alert and oriented to person, place, and time. Mental status is at baseline.   Psychiatric:         Mood and Affect: Mood normal.         Behavior: Behavior normal.         Thought Content: Thought content normal.          Significant Labs:  I have reviewed all pertinent lab results within the past 24 hours.    Significant Diagnostics:  I have reviewed all pertinent imaging results/findings within the past 24 hours.

## 2024-06-13 NOTE — ED NOTES
Nurses Note -- 4 Eyes      6/13/2024   5:21 AM      Skin assessed during: Admit      [] No Altered Skin Integrity Present    []Prevention Measures Documented      [x] Yes- Altered Skin Integrity Present or Discovered   [x] LDA Added if Not in Epic (Describe Wound)   [x] New Altered Skin Integrity was Present on Admit and Documented in LDA   [x] Wound Image Taken    Wound Care Consulted? No    Attending Nurse:  Renea Arriaga RN/Staff Member:   Tess

## 2024-06-13 NOTE — BRIEF OP NOTE
[facility] (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 Right Breast Implant-perm     References:    Click here for ordering Quick Tip   Question Answer Comment   Procedure Type: ENT    Release to patient Immediate        06/13/24 1453                    Implants:   Implant Name Type Inv. Item Serial No.  Lot No. LRB No. Used Action   IMPLANT NATRELLE [id number] [id number] - [id number]  IMPLANT NATRELLE [id number] [id number]  Right 1 Implanted       Complications: None    Disposition: pacu then floor for IV abx

## 2024-06-13 NOTE — ED NOTES
Nurses Note -- 4 Eyes      6/13/2024   7:34 AM      Skin assessed during: Q Shift Change      [] No Altered Skin Integrity Present    []Prevention Measures Documented      [x] Yes- Altered Skin Integrity Present or Discovered   [] LDA Added if Not in Epic (Describe Wound)   [] New Altered Skin Integrity was Present on Admit and Documented in LDA   [] Wound Image Taken    Wound Care Consulted? No      Attending Nurse:  Jaun Arriaga RN/Staff Member:   amanda

## 2024-06-13 NOTE — TRANSFER OF CARE
"Anesthesia Transfer of Care Note    Patient: Deanna Mcclain    Procedure(s) Performed: Procedure(s) (LRB):  WASHOUT right breast, removal of implant, possible replacement (Right)    Patient location: PACU    Anesthesia Type: general    Transport from OR: Transported from OR on 6-10 L/min O2 by face mask with adequate spontaneous ventilation    Post pain: adequate analgesia    Post assessment: no apparent anesthetic complications and tolerated procedure well    Post vital signs: stable    Level of consciousness: awake, alert and oriented    Nausea/Vomiting: no nausea/vomiting    Complications: none    Transfer of care protocol was followed    Last vitals: Visit Vitals  BP (!) 123/56 (BP Location: Left arm, Patient Position: Lying)   Pulse (!) 56   Temp 36.6 °C (97.9 °F) (Temporal)   Resp 18   Ht 4' 11" (1.499 m)   Wt 81.2 kg (179 lb)   SpO2 100%   Breastfeeding No   BMI 36.15 kg/m²     "

## 2024-06-13 NOTE — NURSING TRANSFER
Nursing Transfer Note      6/13/2024   4:26 PM    Nurse giving handoff:gwen morales   Nurse receiving handoff:kofi morales     Reason patient is being transferred: post procedure    Transfer To: 529    Transfer via stretcher    Transfer with 02    Transported by transport      Any special needs or follow-up needed: na    Patient belongings transferred with patient: No    Chart send with patient: Yes    Notified: sister    Patient reassessed at: 6/13/24 @ 9788

## 2024-06-13 NOTE — ANESTHESIA PROCEDURE NOTES
Intubation    Date/Time: 6/13/2024 12:43 PM    Performed by: Aurora Jose MD  Authorized by: Aurora Jose MD    Intubation:     Induction:  Rapid sequence induction    Intubated:  Postinduction    Mask Ventilation:  Not attempted    Attempts:  1    Attempted By:  Staff anesthesiologist    Method of Intubation:  Direct    Blade:  Dean 2    Laryngeal View Grade: Grade IIA - cords partially seen      Difficult Airway Encountered?: No      Complications:  None    Airway Device:  Oral endotracheal tube    Airway Device Size:  7.5    Style/Cuff Inflation:  Cuffed    Inflation Amount (mL):  7    Tube secured:  21    Secured at:  The teeth    Placement Verified By:  Capnometry    Complicating Factors:  None    Findings Post-Intubation:  BS equal bilateral and atraumatic/condition of teeth unchanged

## 2024-06-14 LAB
ACID FAST MOD KINY STN SPEC: NORMAL
ACID FAST MOD KINY STN SPEC: NORMAL
MYCOBACTERIUM SPEC QL CULT: NORMAL
MYCOBACTERIUM SPEC QL CULT: NORMAL
VANCOMYCIN TROUGH SERPL-MCNC: 5.6 UG/ML (ref 10–22)

## 2024-06-14 PROCEDURE — 25000003 PHARM REV CODE 250: Performed by: SURGERY

## 2024-06-14 PROCEDURE — 63600175 PHARM REV CODE 636 W HCPCS: Performed by: SURGERY

## 2024-06-14 PROCEDURE — 25000003 PHARM REV CODE 250: Performed by: STUDENT IN AN ORGANIZED HEALTH CARE EDUCATION/TRAINING PROGRAM

## 2024-06-14 PROCEDURE — 36415 COLL VENOUS BLD VENIPUNCTURE: CPT | Performed by: SURGERY

## 2024-06-14 PROCEDURE — 11000001 HC ACUTE MED/SURG PRIVATE ROOM

## 2024-06-14 PROCEDURE — 80202 ASSAY OF VANCOMYCIN: CPT | Performed by: SURGERY

## 2024-06-14 PROCEDURE — 25000003 PHARM REV CODE 250: Performed by: PHYSICIAN ASSISTANT

## 2024-06-14 PROCEDURE — 63600175 PHARM REV CODE 636 W HCPCS: Performed by: STUDENT IN AN ORGANIZED HEALTH CARE EDUCATION/TRAINING PROGRAM

## 2024-06-14 RX ORDER — ALBUTEROL SULFATE 90 UG/1
2 AEROSOL, METERED RESPIRATORY (INHALATION) EVERY 6 HOURS PRN
Status: DISCONTINUED | OUTPATIENT
Start: 2024-06-14 | End: 2024-06-15 | Stop reason: HOSPADM

## 2024-06-14 RX ORDER — GABAPENTIN 300 MG/1
300 CAPSULE ORAL 3 TIMES DAILY
Status: DISCONTINUED | OUTPATIENT
Start: 2024-06-14 | End: 2024-06-15 | Stop reason: HOSPADM

## 2024-06-14 RX ADMIN — BUSPIRONE HYDROCHLORIDE 7.5 MG: 5 TABLET ORAL at 10:06

## 2024-06-14 RX ADMIN — HYDROMORPHONE HYDROCHLORIDE 1 MG: 1 INJECTION, SOLUTION INTRAMUSCULAR; INTRAVENOUS; SUBCUTANEOUS at 12:06

## 2024-06-14 RX ADMIN — OXYCODONE 5 MG: 5 TABLET ORAL at 02:06

## 2024-06-14 RX ADMIN — ALPRAZOLAM 0.5 MG: 0.5 TABLET ORAL at 09:06

## 2024-06-14 RX ADMIN — GABAPENTIN 300 MG: 300 CAPSULE ORAL at 09:06

## 2024-06-14 RX ADMIN — VANCOMYCIN HYDROCHLORIDE 1250 MG: 1.25 INJECTION, POWDER, LYOPHILIZED, FOR SOLUTION INTRAVENOUS at 09:06

## 2024-06-14 RX ADMIN — GABAPENTIN 300 MG: 300 CAPSULE ORAL at 10:06

## 2024-06-14 RX ADMIN — MUPIROCIN: 20 OINTMENT TOPICAL at 09:06

## 2024-06-14 RX ADMIN — BUSPIRONE HYDROCHLORIDE 7.5 MG: 5 TABLET ORAL at 09:06

## 2024-06-14 RX ADMIN — ENOXAPARIN SODIUM 40 MG: 40 INJECTION SUBCUTANEOUS at 05:06

## 2024-06-14 RX ADMIN — VANCOMYCIN HYDROCHLORIDE 1250 MG: 1.25 INJECTION, POWDER, LYOPHILIZED, FOR SOLUTION INTRAVENOUS at 12:06

## 2024-06-14 RX ADMIN — PANTOPRAZOLE SODIUM 40 MG: 40 TABLET, DELAYED RELEASE ORAL at 09:06

## 2024-06-14 RX ADMIN — SODIUM CHLORIDE, POTASSIUM CHLORIDE, SODIUM LACTATE AND CALCIUM CHLORIDE: 600; 310; 30; 20 INJECTION, SOLUTION INTRAVENOUS at 12:06

## 2024-06-14 RX ADMIN — OXYCODONE 5 MG: 5 TABLET ORAL at 06:06

## 2024-06-14 RX ADMIN — POLYETHYLENE GLYCOL 3350 17 G: 17 POWDER, FOR SOLUTION ORAL at 09:06

## 2024-06-14 RX ADMIN — HYDROMORPHONE HYDROCHLORIDE 1 MG: 1 INJECTION, SOLUTION INTRAMUSCULAR; INTRAVENOUS; SUBCUTANEOUS at 09:06

## 2024-06-14 NOTE — PLAN OF CARE
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 COMMUNITY PLAN [facility] ([place] MEDICAID) / Product Type: Managed Medicaid /     Extended Emergency Contact Information  Primary Emergency Contact: Jose Guadalupe Mcclain [facility]  Home Phone: [phone]  Relation: Son    Discharge Plan A: (P) Home  Discharge Plan B: (P) Home      [facility] [address] [place] [address] [address] [place] [address] [facility] [address]  Phone: [phone] Fax: [phone]      Initial Assessment (most recent)       Adult Discharge Assessment - 06/14/24 1030          Discharge Assessment    Assessment Type Discharge Planning Assessment (P)      Confirmed/corrected address, phone number and insurance Yes (P)      Confirmed Demographics Correct on Facesheet (P)      Source of Information patient (P)      People in Home alone (P)      Facility Arrived From: home (P)      Do you expect to return to your current living situation? Yes (P)      Do you have help at home or someone to help you manage your care at home? No (P)      Prior to hospitilization cognitive status: Alert/Oriented;No Deficits (P)      Current cognitive status: Alert/Oriented;No Deficits (P)      Walking or Climbing Stairs Difficulty no (P)      Dressing/Bathing Difficulty no (P)      Home Accessibility not wheelchair accessible;stairs to enter home (P)      Number of Stairs, Main Entrance ten (P)      Home Layout Able to live on 1st floor (P)      Equipment Currently Used at Home CPAP (P)      Patient  currently being followed by outpatient case management? No (P)      Do you currently have service(s) that help you manage your care at home? No (P)      Do you have any problems affording any of your prescribed medications? No (P)      Is the patient taking medications as prescribed? yes (P)      Who is going to help you get home at discharge? family/friends (P)      How do you get to doctors appointments? car, drives self (P)      Are you on dialysis? No (P)      Do you take coumadin? No (P)      Discharge Plan A Home (P)      Discharge Plan B Home (P)      DME Needed Upon Discharge  none (P)      Discharge Plan discussed with: Patient (P)      Transition of Care Barriers None (P)         Physical Activity    On average, how many days per week do you engage in moderate to strenuous exercise (like a brisk walk)? 0 days (P)      On average, how many minutes do you engage in exercise at this level? 0 min (P)         Financial Resource Strain    How hard is it for you to pay for the very basics like food, housing, medical care, and heating? Not very hard (P)         Housing Stability    In the last 12 months, was there a time when you were not able to pay the mortgage or rent on time? No (P)      At any time in the past 12 months, were you homeless or living in a shelter (including now)? No (P)         Transportation Needs    Has the lack of transportation kept you from medical appointments, meetings, work or from getting things needed for daily living? No (P)         Food Insecurity    Within the past 12 months, you worried that your food would run out before you got the money to buy more. Never true (P)      Within the past 12 months, the food you bought just didn't last and you didn't have money to get more. Never true (P)         Stress    Do you feel stress - tense, restless, nervous, or anxious, or unable to sleep at night because your mind is troubled all the time - these days? Only a little (P)         Social  Isolation    How often do you feel lonely or isolated from those around you?  Never (P)         Alcohol Use    Q1: How often do you have a drink containing alcohol? Monthly or less (P)      Q2: How many drinks containing alcohol do you have on a typical day when you are drinking? 1 or 2 (P)      Q3: How often do you have six or more drinks on one occasion? Never (P)         Utilities    In the past 12 months has the electric, gas, oil, or water company threatened to shut off services in your home? No (P)         Health Literacy    How often do you need to have someone help you when you read instructions, pamphlets, or other written material from your doctor or pharmacy? Sometimes (P)                    Adriana Figueroa CD, MSW, LMSW, RSW   Case Management  [facility]  Email: [email or web address]

## 2024-06-14 NOTE — PLAN OF CARE
SW attempted to complete assessment; Pt STEPHANIA (out of room)      Adriana Figueroa, TEO, MSW, LMSW, RSW   Case Management  [facility]  Email: [email or web address]

## 2024-06-14 NOTE — PROGRESS NOTES
Plastic and Reconstructive Surgery   Progress Note    Subjective:    TIFFANY overnight, patient seen and examined, s/p implant removal and replacement    Objective:  Vital signs in last 24 hours:  Temp:  [97 °F (36.1 °C)-98.6 °F (37 °C)] 98.6 °F (37 °C)  Pulse:  [55-80] 68  Resp:  [11-23] 16  SpO2:  [92 %-100 %] 92 %  BP: (107-127)/(55-73) 111/59    Intake/Output last 3 shifts:  I/O last 3 completed shifts:  In: 348.2 [IV Piggyback:348.2]  Out: 10 [Drains:10]    Intake/Output this shift:  No intake/output data recorded.        Physical Exam:  VITAL SIGNS:   Vitals:    24 1945 24 2057 24 2359 24 0223   BP:   (!) 111/59    BP Location:   Left arm    Patient Position:   Sitting    Pulse:   68    Resp:  16 20 16   Temp:   98.6 °F (37 °C)    TempSrc:   Oral    SpO2:   (!) 92%    Weight: 81.6 kg (179 lb 14.3 oz)      Height: 5' (1.524 m)        TMAX: Temp (24hrs), Av.1 °F (36.7 °C), Min:97 °F (36.1 °C), Max:98.6 °F (37 °C)    General: Alert; No acute distress  Cardiovascular: Regular rate   Breast: Incision CDI, JOSE RAMON drain in place, minimal serosang fluid  Respiratory: Normal respiratory effort. Chest rise symmetric.   Abdomen: Soft, nontender, nondistended  Extremity: Moves all extremities equally.  Neurologic: No focal deficit. Speech normal      Scheduled Medications enoxparin, 40 mg, Q24H (prophylaxis, 1700)  mupirocin, , BID  pantoprazole, 40 mg, Daily  polyethylene glycol, 17 g, Daily  vancomycin (VANCOCIN) IV (PEDS and ADULTS), 1,250 mg, Q24H      PRN Medications   Current Facility-Administered Medications:     acetaminophen, 650 mg, Oral, Q4H PRN    albuterol-ipratropium, 3 mL, Nebulization, Q6H PRN    ALPRAZolam, 0.5 mg, Oral, BID PRN    HYDROmorphone, 1 mg, Intravenous, Q4H PRN    ondansetron, 4 mg, Intravenous, Q8H PRN    oxyCODONE, 5 mg, Oral, Q4H PRN    prochlorperazine, 5 mg, Intravenous, Q6H PRN    sodium chloride 0.9%, 10 mL, Intravenous, PRN    Pharmacy to dose Vancomycin consult, ,  , Once **AND** vancomycin - pharmacy to dose, , Intravenous, pharmacy to manage frequency    Recent Labs:   Lab Results   Component Value Date    WBC 6.45 06/13/2024    HGB 13.1 06/13/2024    HCT 40.0 06/13/2024    MCV 97 06/13/2024     06/13/2024     Lab Results   Component Value Date     (H) 06/13/2024     06/13/2024    K 4.4 06/13/2024     06/13/2024    BUN 15 06/13/2024         Assessment: 59 y.o. y/o female 1 Day Post-Op s/p Procedure(s):  WASHOUT right breast, removal of implant, possible replacement Doing well postoperatively.    Plan  - doing well post op, no organisms seen on gram stain, clinically did not look infected at all  - ABX to be switched to oral meds today  - out of bed  - lovenox  - IVF off  - Dispo: Anticipate DC today      Jayson Mcdonald- Fellow  Department of Plastic and Reconstructive Surgery  [phone] (office)

## 2024-06-14 NOTE — PROGRESS NOTES
Pharmacokinetic Assessment Follow Up: IV Vancomycin    Vancomycin serum concentration assessment(s):    The trough level was drawn correctly and can be used to guide therapy at this time. The measurement is below the desired definitive target range of 10 to 20 mcg/mL.    Vancomycin Regimen Plan:    Change regimen to Vancomycin 1250 mg IV every 12 hours with next serum trough concentration measured at 2000 prior to 4th dose on 06/15/24    Drug levels (last 3 results):  Recent Labs   Lab Result Units 06/14/24  1058   Vancomycin-Trough ug/mL 5.6*       Pharmacy will continue to follow and monitor vancomycin.    Please contact pharmacy at extension [phone] for questions regarding this assessment.    Thank you for the consult,   Kell Drew, Steve       Patient brief summary:  Deanna Mcclain is a 59 y.o. female initiated on antimicrobial therapy with IV Vancomycin for treatment of skin & soft tissue infection    The patient's current regimen is Vancomycin 1250 mg q24h    Drug Allergies:   Review of patient's allergies indicates:  No Known Allergies    Actual Body Weight:   81.6 kg    Renal Function:   Estimated Creatinine Clearance: 72.5 mL/min (based on SCr of 0.8 mg/dL).,     Dialysis Method (if applicable):  N/A    CBC (last 72 hours):  Recent Labs   Lab Result Units 06/12/24  1139 06/13/24  0512   WBC K/uL 7.61 6.45   Hemoglobin g/dL 15.2 13.1   Hematocrit % 44.4 40.0   Platelets K/uL 220 215   Gran % % 71.8 57.1   Lymph % % 17.0* 27.1   Mono % % 7.2 10.5   Eosinophil % % 2.4 3.6   Basophil % % 1.3 1.4   Differential Method  Automated Automated       Metabolic Panel (last 72 hours):  Recent Labs   Lab Result Units 06/12/24  1139 06/13/24  0512   Sodium mmol/L 141 138   Potassium mmol/L 4.3 4.4   Chloride mmol/L 108 110   CO2 mmol/L 21* 20*   Glucose mg/dL 98 114*   BUN mg/dL 16 15   Creatinine mg/dL 1.0 0.8   Albumin g/dL 3.4*  --    Total Bilirubin mg/dL 0.4  --    Alkaline Phosphatase U/L 94  --    AST U/L 14   --    ALT U/L 21  --        Vancomycin Administrations:  vancomycin given in the last 96 hours                     vancomycin 1,250 mg in dextrose 5 % (D5W) 250 mL IVPB (Vial-Mate) (mg) 1,250 mg New Bag 06/14/24 1212    vancomycin (VANCOCIN) injection 1 g (g) 1 g Given 06/13/24 1251    vancomycin 1,250 mg in dextrose 5 % (D5W) 250 mL IVPB (Vial-Mate) (mg) 1,250 mg New Bag 06/12/24 1223                    Microbiologic Results:  Microbiology Results (last 7 days)       Procedure Component Value Units Date/Time    Culture, Anaerobe [[id number]] Collected: 06/13/24 1308    Order Status: Completed Specimen: Tissue from Breast, Right Updated: 06/14/24 0904     Anaerobic Culture Culture in progress    Culture, Anaerobe [[id number]] Collected: 06/13/24 1302    Order Status: Completed Specimen: Incision site from Breast, Right Updated: 06/14/24 0904     Anaerobic Culture Culture in progress    Aerobic culture [[id number]] Collected: 06/13/24 1302    Order Status: Completed Specimen: Incision site from Breast, Right Updated: 06/14/24 0740     Aerobic Bacterial Culture No growth    Aerobic culture [[id number]] Collected: 06/13/24 1309    Order Status: Completed Specimen: Wound from Breast, Right Updated: 06/14/24 0740     Aerobic Bacterial Culture No growth    Narrative:      tissue    Gram stain [[id number]] Collected: 06/13/24 1302    Order Status: Completed Specimen: Incision site from Breast, Right Updated: 06/13/24 1730     Gram Stain Result Rare WBC's      No organisms seen    Gram stain [[id number]] Collected: 06/13/24 1308    Order Status: Completed Specimen: Tissue from Breast, Right Updated: 06/13/24 1649     Gram Stain Result Rare WBC's      No organisms seen    AFB Culture & Smear [[id number]] Collected: 06/13/24 1308    Order Status: Sent Specimen: Tissue from Breast, Right Updated: 06/13/24 1332    Fungus culture [[id number]] Collected: 06/13/24 1308    Order Status: Sent Specimen: Tissue from Breast, Right  Updated: 06/13/24 1332    AFB Culture & Smear [[id number]] Collected: 06/13/24 1302    Order Status: Sent Specimen: Incision site from Breast, Right Updated: 06/13/24 1326    Fungus culture [[id number]] Collected: 06/13/24 1302    Order Status: Sent Specimen: Incision site from Breast, Right Updated: 06/13/24 1324

## 2024-06-14 NOTE — CARE UPDATE
I have reviewed the chart of Deanna Mcclain and collaborated with Brad Zavala,* in the care of the patient who is hospitalized for the following:    Active Hospital Problems    Diagnosis    Complication of breast implant          I have reviewed Deanna Mcclain with the multidisciplinary team during discharge huddle.       Natalia Olvera PA-C  [facility]

## 2024-06-15 VITALS
TEMPERATURE: 98 F | DIASTOLIC BLOOD PRESSURE: 71 MMHG | RESPIRATION RATE: 16 BRPM | HEART RATE: 60 BPM | OXYGEN SATURATION: 98 % | HEIGHT: 60 IN | WEIGHT: 179.88 LBS | SYSTOLIC BLOOD PRESSURE: 119 MMHG | BODY MASS INDEX: 35.31 KG/M2

## 2024-06-15 LAB — BACTERIA SPEC AEROBE CULT: NO GROWTH

## 2024-06-15 PROCEDURE — 25000003 PHARM REV CODE 250: Performed by: STUDENT IN AN ORGANIZED HEALTH CARE EDUCATION/TRAINING PROGRAM

## 2024-06-15 PROCEDURE — 63600175 PHARM REV CODE 636 W HCPCS: Performed by: SURGERY

## 2024-06-15 PROCEDURE — 25000003 PHARM REV CODE 250: Performed by: SURGERY

## 2024-06-15 PROCEDURE — 25000003 PHARM REV CODE 250: Performed by: PHYSICIAN ASSISTANT

## 2024-06-15 RX ORDER — OXYCODONE HYDROCHLORIDE 5 MG/1
5 TABLET ORAL EVERY 4 HOURS PRN
Qty: 20 TABLET | Refills: 0 | Status: SHIPPED | OUTPATIENT
Start: 2024-06-15 | End: 2024-06-22

## 2024-06-15 RX ORDER — CIPROFLOXACIN 500 MG/1
500 TABLET ORAL EVERY 12 HOURS
Qty: 14 TABLET | Refills: 0 | Status: SHIPPED | OUTPATIENT
Start: 2024-06-15 | End: 2024-06-22

## 2024-06-15 RX ORDER — CLINDAMYCIN HYDROCHLORIDE 300 MG/1
300 CAPSULE ORAL EVERY 8 HOURS
Qty: 21 CAPSULE | Refills: 0 | Status: SHIPPED | OUTPATIENT
Start: 2024-06-15 | End: 2024-06-17 | Stop reason: ALTCHOICE

## 2024-06-15 RX ORDER — METHOCARBAMOL 500 MG/1
500 TABLET, FILM COATED ORAL 3 TIMES DAILY
Qty: 30 TABLET | Refills: 0 | Status: SHIPPED | OUTPATIENT
Start: 2024-06-15 | End: 2024-06-25

## 2024-06-15 RX ADMIN — PANTOPRAZOLE SODIUM 40 MG: 40 TABLET, DELAYED RELEASE ORAL at 09:06

## 2024-06-15 RX ADMIN — OXYCODONE 5 MG: 5 TABLET ORAL at 05:06

## 2024-06-15 RX ADMIN — VANCOMYCIN HYDROCHLORIDE 1250 MG: 1.25 INJECTION, POWDER, LYOPHILIZED, FOR SOLUTION INTRAVENOUS at 09:06

## 2024-06-15 RX ADMIN — GABAPENTIN 300 MG: 300 CAPSULE ORAL at 09:06

## 2024-06-15 RX ADMIN — ALPRAZOLAM 0.5 MG: 0.5 TABLET ORAL at 09:06

## 2024-06-15 RX ADMIN — BUSPIRONE HYDROCHLORIDE 7.5 MG: 5 TABLET ORAL at 09:06

## 2024-06-15 NOTE — PROGRESS NOTES
Nurses Note -- 4 Eyes      6/15/2024   6:38 AM      Skin assessed during: Q Shift Change      [x] No Altered Skin Integrity Present    []Prevention Measures Documented      [] Yes- Altered Skin Integrity Present or Discovered   [] LDA Added if Not in Epic (Describe Wound)   [] New Altered Skin Integrity was Present on Admit and Documented in LDA   [] Wound Image Taken    Wound Care Consulted? No    Attending Nurse:  Darius Arriaga RN/Staff Member:  YANNICK Garcia

## 2024-06-15 NOTE — PLAN OF CARE
Problem: Adult Inpatient Plan of Care  Goal: Plan of Care Review  6/15/2024 0958 by Nas Majano RN  Outcome: Met  6/15/2024 0957 by Nas Majano RN  Outcome: Progressing  Goal: Patient-Specific Goal (Individualized)  6/15/2024 0958 by Nas Majano RN  Outcome: Met  6/15/2024 0957 by Nas Majano RN  Outcome: Progressing  Goal: Absence of Hospital-Acquired Illness or Injury  6/15/2024 0958 by Nas Majano RN  Outcome: Met  6/15/2024 0957 by Nas Majano RN  Outcome: Progressing  Goal: Optimal Comfort and Wellbeing  6/15/2024 0958 by Nas Majano RN  Outcome: Met  6/15/2024 0957 by Nas Majano RN  Outcome: Progressing  Goal: Readiness for Transition of Care  6/15/2024 0958 by Nas Majano RN  Outcome: Met  6/15/2024 0957 by Nas Majano RN  Outcome: Progressing     Problem: Wound  Goal: Optimal Coping  6/15/2024 0958 by Nas Majano RN  Outcome: Met  6/15/2024 0957 by Nas Majano RN  Outcome: Progressing  Goal: Optimal Functional Ability  6/15/2024 0958 by Nas Majano RN  Outcome: Met  6/15/2024 0957 by Nas Majano RN  Outcome: Progressing  Goal: Absence of Infection Signs and Symptoms  6/15/2024 0958 by Nas Majano RN  Outcome: Met  6/15/2024 0957 by Nas Majano RN  Outcome: Progressing  Goal: Improved Oral Intake  6/15/2024 0958 by Nas Majano RN  Outcome: Met  6/15/2024 0957 by Nas Majano RN  Outcome: Progressing  Goal: Optimal Pain Control and Function  6/15/2024 0958 by Nas Majano, RN  Outcome: Met  6/15/2024 0957 by Nas Majano, RN  Outcome: Progressing  Goal: Skin Health and Integrity  6/15/2024 0958 by Nas Majano, RN  Outcome: Met  6/15/2024 0957 by Nas Majano, RN  Outcome: Progressing  Goal: Optimal Wound Healing  6/15/2024 0958 by  Nas Majano, RN  Outcome: Met  6/15/2024 0957 by Nas Majano, RN  Outcome: Progressing     Problem: Fall Injury Risk  Goal: Absence of Fall and Fall-Related Injury  6/15/2024 0958 by Nas Majano, RN  Outcome: Met  6/15/2024 0957 by Nas Majano, RN  Outcome: Progressing   Patient discharging home, discharge instructions have been reviewed in detail with patient and sister, allowed time for questions, all questions answered, IV removed, gauze and tape applied, bleeding controlled, discharge medications have been delivered to bedside, awaiting transportation .

## 2024-06-15 NOTE — PROGRESS NOTES
Plastic and Reconstructive Surgery   Progress Note    Subjective:    TIFFANY overnight, patient seen and examined, s/p implant removal and replacement    Objective:  Vital signs in last 24 hours:  Temp:  [97.9 °F (36.6 °C)-98.6 °F (37 °C)] 97.9 °F (36.6 °C)  Pulse:  [58-77] 60  Resp:  [16-20] 16  SpO2:  [97 %-99 %] 98 %  BP: (111-146)/(57-71) 119/71    Intake/Output last 3 shifts:  I/O last 3 completed shifts:  In: 400 [P.O.:400]  Out: 65 [Drains:65]    Intake/Output this shift:  I/O this shift:  In: -   Out: 20 [Drains:20]        Physical Exam:  VITAL SIGNS:   Vitals:    06/15/24 0025 06/15/24 0328 06/15/24 0548 06/15/24 0803   BP: 126/61 123/61  119/71   BP Location: Right arm Right arm  Left arm   Patient Position: Lying Lying  Lying   Pulse: 68 (!) 58  60   Resp: 16 16 16 16   Temp: 98.2 °F (36.8 °C) 98 °F (36.7 °C)  97.9 °F (36.6 °C)   TempSrc: Oral Oral  Oral   SpO2: 99% 97%  98%   Weight:       Height:         TMAX: Temp (24hrs), Av.1 °F (36.7 °C), Min:97.9 °F (36.6 °C), Max:98.6 °F (37 °C)    General: Alert; No acute distress  Cardiovascular: Regular rate   Breast: Incision CDI, JOSE RAMON drain in place, minimal serosang fluid  Respiratory: Normal respiratory effort. Chest rise symmetric.   Abdomen: Soft, nontender, nondistended  Extremity: Moves all extremities equally.  Neurologic: No focal deficit. Speech normal      Scheduled Medications busPIRone, 7.5 mg, TID  enoxparin, 40 mg, Q24H (prophylaxis, 1700)  gabapentin, 300 mg, TID  mupirocin, , BID  pantoprazole, 40 mg, Daily  polyethylene glycol, 17 g, Daily  vancomycin (VANCOCIN) IV (PEDS and ADULTS), 1,250 mg, Q12H      PRN Medications   Current Facility-Administered Medications:     acetaminophen, 650 mg, Oral, Q4H PRN    albuterol, 2 puff, Inhalation, Q6H PRN    albuterol-ipratropium, 3 mL, Nebulization, Q6H PRN    ALPRAZolam, 0.5 mg, Oral, BID PRN    HYDROmorphone, 1 mg, Intravenous, Q4H PRN    ondansetron, 4 mg, Intravenous, Q8H PRN    oxyCODONE, 5 mg,  Oral, Q4H PRN    prochlorperazine, 5 mg, Intravenous, Q6H PRN    sodium chloride 0.9%, 10 mL, Intravenous, PRN    Pharmacy to dose Vancomycin consult, , , Once **AND** vancomycin - pharmacy to dose, , Intravenous, pharmacy to manage frequency    Recent Labs:   Lab Results   Component Value Date    WBC 6.45 06/13/2024    HGB 13.1 06/13/2024    HCT 40.0 06/13/2024    MCV 97 06/13/2024     06/13/2024     Lab Results   Component Value Date     (H) 06/13/2024     06/13/2024    K 4.4 06/13/2024     06/13/2024    BUN 15 06/13/2024         Assessment: 59 y.o. y/o female 2 Days Post-Op s/p Procedure(s):  WASHOUT right breast, removal of implant, possible replacement Doing well postoperatively.    Plan  - doing well post op, no organisms seen on gram stain, clinically did not look infected at all  - ABX oral meds  - out of bed  - lovenox  - IVF off  - Dispo: Anticipate DC today      Catarino Michel- Fellow  Department of Plastic and Reconstructive Surgery  [phone] (office)

## 2024-06-15 NOTE — PLAN OF CARE
[facility]  Discharge Final Note    Primary Care Provider: No, Primary Doctor    Expected Discharge Date: 6/15/2024    Final Discharge Note (most recent)       Final Note - 06/15/24 1014          Final Note    Assessment Type Final Discharge Note (P)      Anticipated Discharge Disposition Home or Self Care (P)      Hospital Resources/Appts/Education Provided Provided patient/caregiver with written discharge plan information;Provided education on problems/symptoms using teachback;Appointments scheduled and added to AVS (P)         Post-Acute Status    Post-Acute Authorization Other (P)      Other Status No Post-Acute Service Needs (P)      Discharge Delays None known at this time (P)                      Important Message from Medicare             SW noting pt's discharge orders. After review of pt's medical record, no discharge needs identified at this time.     Christy Lyn LMSW

## 2024-06-15 NOTE — DISCHARGE SUMMARY
[facility]  Discharge Summary      Admit Date: 6/12/2024    Discharge Date and Time:  06/15/2024 9:15 AM    Attending Physician: Brad Zavala,*     Reason for Admission: erythema breast    Procedures Performed: Procedure(s) (LRB):  WASHOUT right breast, removal of implant, possible replacement (Right)  REPLACEMENT, IMPLANT, BREAST  CAPSULOTOMY, BREAST  REMOVAL, GRAFT    Hospital Course (synopsis of major diagnoses, care, treatment, and services provided during the course of the hospital stay): patient admitted for concern for infected implant, taken to OR and implant exchanged, no sign of infection however pending final cultures, cleared for PR home      Goals of Care Treatment Preferences:  Code Status: Full Code      Consults: none    Significant Diagnostic Studies: N/A    Final Diagnoses:    Principal Problem: Complication of breast implant   Secondary Diagnoses:   Active Hospital Problems    Diagnosis  POA    *Complication of breast implant [T85.9XXA]  Yes      Resolved Hospital Problems   No resolved problems to display.       Discharged Condition: stable    Disposition: Home or Self Care    Follow Up/Patient Instructions:     Medications:  Reconciled Home Medications:      Medication List        START taking these medications      ciprofloxacin HCl 500 MG tablet  Commonly known as: CIPRO  Take 1 tablet (500 mg total) by mouth every 12 (twelve) hours. for 7 days     clindamycin 300 MG capsule  Commonly known as: CLEOCIN  Take 1 capsule (300 mg total) by mouth every 8 (eight) hours. for 7 days            CONTINUE taking these medications      * albuterol 90 mcg/actuation inhaler  Commonly known as: PROVENTIL/VENTOLIN HFA  Inhale 2 puffs into the lungs.     * albuterol 90 mcg/actuation inhaler  Commonly known as: PROVENTIL/VENTOLIN HFA     alendronate 70 MG tablet  Commonly known as: FOSAMAX  TAKE ONE TABLET BY MOUTH EVERY WEEK AS DIRECTED     ALPRAZolam 0.5 MG tablet  Commonly known as:  XANAX  Take 0.5 mg by mouth.     amLODIPine 5 MG tablet  Commonly known as: NORVASC  Take 5 mg by mouth once daily.     busPIRone 7.5 MG tablet  Commonly known as: BUSPAR  Take 7.5 mg by mouth 3 (three) times daily.     cholecalciferol (vitamin D3) 1,250 mcg (50,000 unit) capsule  Take 50,000 Units by mouth every 7 days.     clobetasol 0.05% 0.05 % Oint  Commonly known as: TEMOVATE  APPLY OINTMENT TOPICALLY TO AFFECTED AREA AS NEEDED     ergocalciferol 50,000 unit Cap  Commonly known as: ERGOCALCIFEROL  Take 50,000 Units by mouth.     EScitalopram oxalate 20 MG tablet  Commonly known as: LEXAPRO  Take 20 mg by mouth once daily.     gabapentin 300 MG capsule  Commonly known as: NEURONTIN  Take 300 mg by mouth 3 (three) times daily.     INCRUSE ELLIPTA 62.5 mcg/actuation inhalation capsule  Generic drug: umeclidinium  Take by mouth once daily.     linaCLOtide 145 mcg Cap capsule  Commonly known as: LINZESS  Take 145 mcg by mouth.     lisinopriL-hydrochlorothiazide 20-12.5 mg per tablet  Commonly known as: PRINZIDE,ZESTORETIC  Take 1 tablet by mouth once daily.     LORazepam 0.5 MG tablet  Commonly known as: ATIVAN  TAKE 1 TABLET BY MOUTH TWICE DAILY. MUST SEE DOCTOR FOR FURTHER REFILLS     NON FORMULARY MEDICATION  CPAP     omeprazole 20 MG capsule  Commonly known as: PRILOSEC  Take 20 mg by mouth once daily.     oxyCODONE 5 MG immediate release tablet  Commonly known as: ROXICODONE  Take 1 tablet (5 mg total) by mouth every 4 (four) hours as needed for Pain.     promethazine 25 MG tablet  Commonly known as: PHENERGAN  Take 25 mg by mouth.     traMADoL 50 mg tablet  Commonly known as: ULTRAM  Take 50 mg by mouth 3 (three) times daily as needed.           * This list has 2 medication(s) that are the same as other medications prescribed for you. Read the directions carefully, and ask your doctor or other care provider to review them with you.                No discharge procedures on file.

## 2024-06-15 NOTE — NURSING
Nurses Note -- 4 Eyes      6/15/2024   10:00 AM      Skin assessed during: Daily Assessment      [x] No Altered Skin Integrity Present    []Prevention Measures Documented      [] Yes- Altered Skin Integrity Present or Discovered   [] LDA Added if Not in Epic (Describe Wound)   [] New Altered Skin Integrity was Present on Admit and Documented in LDA   [] Wound Image Taken    Wound Care Consulted? No    Attending Nurse:  NICK Lovell    Second RN/Staff Member:  YANNICK Atkins

## 2024-06-17 DIAGNOSIS — N64.89 RECURRENT SEROMA OF BREAST: Primary | ICD-10-CM

## 2024-06-17 LAB
BACTERIA SPEC AEROBE CULT: NO GROWTH
BACTERIA SPEC ANAEROBE CULT: ABNORMAL
FUNGUS SPEC CULT: NORMAL
FUNGUS SPEC CULT: NORMAL

## 2024-06-17 RX ORDER — METRONIDAZOLE 500 MG/1
500 TABLET ORAL 2 TIMES DAILY
Qty: 14 TABLET | Refills: 0 | Status: SHIPPED | OUTPATIENT
Start: 2024-06-17 | End: 2024-06-24

## 2024-06-17 NOTE — ANESTHESIA POSTPROCEDURE EVALUATION
Anesthesia Post Evaluation    Patient: Deanna Mcclain    Procedure(s) Performed: Procedure(s) (LRB):  WASHOUT right breast, removal of implant, possible replacement (Right)  REPLACEMENT, IMPLANT, BREAST  CAPSULOTOMY, BREAST  REMOVAL, GRAFT    Final Anesthesia Type: general      Patient location during evaluation: PACU  Patient participation: Yes- Able to Participate  Level of consciousness: awake and alert and oriented  Post-procedure vital signs: reviewed and stable  Pain management: adequate  Airway patency: patent    PONV status at discharge: No PONV  Anesthetic complications: no      Cardiovascular status: hemodynamically stable  Respiratory status: unassisted  Hydration status: euvolemic  Follow-up not needed.              Vitals Value Taken Time   /71 06/15/24 0803   Temp 36.6 °C (97.9 °F) 06/15/24 0803   Pulse 60 06/15/24 0803   Resp 16 06/15/24 0803   SpO2 98 % 06/15/24 0803         Event Time   Out of Recovery 06/13/2024 15:40:00         Pain/Lowell Score: No data recorded

## 2024-06-18 ENCOUNTER — OFFICE VISIT (OUTPATIENT)
Dept: DERMATOLOGY | Facility: CLINIC | Age: 59
End: 2024-06-18
Payer: MEDICAID

## 2024-06-18 DIAGNOSIS — L30.9 DERMATITIS: Primary | ICD-10-CM

## 2024-06-18 PROCEDURE — 99203 OFFICE O/P NEW LOW 30 MIN: CPT | Mod: S$PBB,,, | Performed by: DERMATOLOGY

## 2024-06-18 PROCEDURE — 99214 OFFICE O/P EST MOD 30 MIN: CPT | Mod: PBBFAC | Performed by: DERMATOLOGY

## 2024-06-18 PROCEDURE — 1111F DSCHRG MED/CURRENT MED MERGE: CPT | Mod: CPTII,,, | Performed by: DERMATOLOGY

## 2024-06-18 PROCEDURE — 4010F ACE/ARB THERAPY RXD/TAKEN: CPT | Mod: CPTII,,, | Performed by: DERMATOLOGY

## 2024-06-18 PROCEDURE — 99999 PR PBB SHADOW E&M-EST. PATIENT-LVL IV: CPT | Mod: PBBFAC,,, | Performed by: DERMATOLOGY

## 2024-06-18 PROCEDURE — 1160F RVW MEDS BY RX/DR IN RCRD: CPT | Mod: CPTII,,, | Performed by: DERMATOLOGY

## 2024-06-18 PROCEDURE — 1159F MED LIST DOCD IN RCRD: CPT | Mod: CPTII,,, | Performed by: DERMATOLOGY

## 2024-06-18 NOTE — PROGRESS NOTES
Subjective:      Patient ID:  Deanna Mcclain is a 59 y.o. female who presents for   Chief Complaint   Patient presents with    Rash       HPI  Today the patient reports she was referred from plastic surgery for further evaluation of a rash on her right breast. 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 On 6/12/24, she was seen in plastic surgery clinic where there was concerned for infected implant on the right side due to worsening swelling and redness. Given this, she was admitted 6/12-6/15 for an implant exchange. Complex fluid collections along the implant, favoring infectious process and she was taken for wash out and implant exchange. Intraoperative findings were not thought to be consistent with an infection and she was ultimately discharged with clindamycin and ciprofloxacin. Cultures taken from the wound at that time only with growth of few finegoldia magna from the anaerobic culture.   Since discharge 3 days ago, redness erythema and swelling are improved.    Review of Systems   All other systems reviewed and are negative.    Objective:   Physical Exam   Constitutional: She appears well-developed and well-nourished.   Neurological: She is alert and oriented to person, place, and time.   Psychiatric: She has a normal mood and affect.   Skin:   Areas Examined (abnormalities noted in diagram):   Head / Face Inspection Performed  Neck Inspection Performed  Chest / Axilla Inspection Performed     Diagram Legend     Erythematous scaling macule/papule c/w actinic keratosis       Vascular papule c/w angioma      Pigmented verrucoid papule/plaque c/w seborrheic keratosis      Yellow umbilicated papule c/w sebaceous hyperplasia      Irregularly shaped tan macule c/w lentigo     1-2 mm smooth white papules consistent with Milia      Movable subcutaneous cyst with punctum c/w epidermal inclusion cyst      Subcutaneous movable cyst c/w  pilar cyst      Firm pink to brown papule c/w dermatofibroma      Pedunculated fleshy papule(s) c/w skin tag(s)      Evenly pigmented macule c/w junctional nevus     Mildly variegated pigmented, slightly irregular-bordered macule c/w mildly atypical nevus      Flesh colored to evenly pigmented papule c/w intradermal nevus       Pink pearly papule/plaque c/w basal cell carcinoma      Erythematous hyperkeratotic cursted plaque c/w SCC      Surgical scar with no sign of skin cancer recurrence      Open and closed comedones      Inflammatory papules and pustules      Verrucoid papule consistent consistent with wart     Erythematous eczematous patches and plaques     Dystrophic onycholytic nail with subungual debris c/w onychomycosis     Umbilicated papule    Erythematous-base heme-crusted tan verrucoid plaque consistent with inflamed seborrheic keratosis     Erythematous Silvery Scaling Plaque c/w Psoriasis     See annotation      Assessment / Plan:        Dermatitis  S/p bilateral breast reconstruction with implants on 2/29/24 complicated by 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   - Cutaneous findings clinically consistent with resolving cellulitis  - Continue antibiotics per plastic surgery  - Anaerobic cultures growing finegoldia magna. Patient is on clindamycin. Follow-up susceptibilities and adjust if deemed necessary.  Finegoldia may represent contaminant.    *addendum after communication with plastic surgery, plastic surgery is concerned due to presence of persistent erythematous patches on breast since initial surgery.  At this time the erythema does appear to be improved.  If there is clinical concern for inflammatory breast cancer in any particular red patch/lesion punch biopsy can be performed in the future.  Persistent fluid collections can also lead to erythema of skin; most commonly seen in stasis dermatitis in  lower extremities, but would be plausible in this setting if there is a persistent seroma            No follow-ups on file.

## 2024-06-19 NOTE — PROGRESS NOTES
I have seen the patient, reviewed the Resident's progress note. I have personally interviewed and examined the patient at bedside and agree with the findings.     Divya Gutiérrez MD  [facility]

## 2024-06-20 LAB — BACTERIA SPEC ANAEROBE CULT: NORMAL

## 2024-06-26 ENCOUNTER — OFFICE VISIT (OUTPATIENT)
Dept: PLASTIC SURGERY | Facility: CLINIC | Age: 59
End: 2024-06-26
Payer: MEDICAID

## 2024-06-26 VITALS
WEIGHT: 179.88 LBS | SYSTOLIC BLOOD PRESSURE: 125 MMHG | HEIGHT: 60 IN | DIASTOLIC BLOOD PRESSURE: 26 MMHG | HEART RATE: 60 BPM | BODY MASS INDEX: 35.31 KG/M2

## 2024-06-26 DIAGNOSIS — Z09 SURGERY FOLLOW-UP EXAMINATION: Primary | ICD-10-CM

## 2024-06-26 PROCEDURE — 99213 OFFICE O/P EST LOW 20 MIN: CPT | Mod: PBBFAC | Performed by: SURGERY

## 2024-06-26 PROCEDURE — 99999 PR PBB SHADOW E&M-EST. PATIENT-LVL III: CPT | Mod: PBBFAC,,, | Performed by: SURGERY

## 2024-06-26 PROCEDURE — 3078F DIAST BP <80 MM HG: CPT | Mod: CPTII,,, | Performed by: SURGERY

## 2024-06-26 PROCEDURE — 4010F ACE/ARB THERAPY RXD/TAKEN: CPT | Mod: CPTII,,, | Performed by: SURGERY

## 2024-06-26 PROCEDURE — 3074F SYST BP LT 130 MM HG: CPT | Mod: CPTII,,, | Performed by: SURGERY

## 2024-06-26 PROCEDURE — 1159F MED LIST DOCD IN RCRD: CPT | Mod: CPTII,,, | Performed by: SURGERY

## 2024-06-26 PROCEDURE — 99024 POSTOP FOLLOW-UP VISIT: CPT | Mod: ,,, | Performed by: SURGERY

## 2024-06-26 NOTE — PROGRESS NOTES
Plastic Surgery Clinic Postop Visit    Subjective:      Deanna Mcclain is a 59 y.o. year old female who presents to the Plastic Surgery Clinic on 06/26/2024 for follow up visit status post right breast washout and implant exchange 6/13/24.   Denies fever, chills, nausea, vomiting, or other systemic signs of infection. JOSE RAMON drain about 10-20 cc SS output per day. Patient states has continued erythema over right breast that was been present for a long time. She states swelling has improved significantly.         ROS:  Negative unless otherwise stated above in HPI    Objective:     Physical Exam:  Vitals:    06/26/24 0957   BP: (!) 125/26   Pulse: 60       WD WN NAD  VSS  Normal resp effort  Breast: right breast incision CDI, erythema over right breast, JOSE RAMON drain SS         Assessment:       No diagnosis found.    Plan:   59 y.o. female status post right breast washout and implant exchange 6/13/24.  - Doing well, no issues  - Pt was seen and evaluated by myself and Dr. Brad Zavala.   - JOSE RAMON drain removed   - wound care discussed, don't submerge in water for 48 hours   - Return to clinic PRN      All questions were answered. The patient was advised to call the clinic with any questions or concerns prior to their next visit.       Alrieza Claire MD  Plastic and Reconstructive Surgery  [phone]

## 2024-07-17 ENCOUNTER — OFFICE VISIT (OUTPATIENT)
Dept: PLASTIC SURGERY | Facility: CLINIC | Age: 59
End: 2024-07-17
Payer: MEDICAID

## 2024-07-17 VITALS
DIASTOLIC BLOOD PRESSURE: 86 MMHG | WEIGHT: 179.88 LBS | SYSTOLIC BLOOD PRESSURE: 132 MMHG | HEART RATE: 79 BPM | BODY MASS INDEX: 35.31 KG/M2 | HEIGHT: 60 IN | OXYGEN SATURATION: 98 %

## 2024-07-17 DIAGNOSIS — Z09 SURGERY FOLLOW-UP EXAMINATION: Primary | ICD-10-CM

## 2024-07-17 PROCEDURE — 4010F ACE/ARB THERAPY RXD/TAKEN: CPT | Mod: CPTII,,, | Performed by: SURGERY

## 2024-07-17 PROCEDURE — 99024 POSTOP FOLLOW-UP VISIT: CPT | Mod: ,,, | Performed by: SURGERY

## 2024-07-17 PROCEDURE — 1159F MED LIST DOCD IN RCRD: CPT | Mod: CPTII,,, | Performed by: SURGERY

## 2024-07-17 PROCEDURE — 3075F SYST BP GE 130 - 139MM HG: CPT | Mod: CPTII,,, | Performed by: SURGERY

## 2024-07-17 PROCEDURE — 3079F DIAST BP 80-89 MM HG: CPT | Mod: CPTII,,, | Performed by: SURGERY

## 2024-07-17 PROCEDURE — 99999 PR PBB SHADOW E&M-EST. PATIENT-LVL IV: CPT | Mod: PBBFAC,,, | Performed by: SURGERY

## 2024-07-17 PROCEDURE — 99214 OFFICE O/P EST MOD 30 MIN: CPT | Mod: PBBFAC | Performed by: SURGERY

## 2024-07-17 RX ORDER — BUPROPION HYDROCHLORIDE 150 MG/1
150 TABLET, EXTENDED RELEASE ORAL 2 TIMES DAILY
COMMUNITY

## 2024-07-17 NOTE — PROGRESS NOTES
Plastic Surgery Clinic Postop Visit    Subjective:      Deanna Mcclain is a 59 y.o. year old female who presents to the Plastic Surgery Clinic on 07/17/2024 for follow up visit status post right breast washout and implant exchange 6/13/24. Presents today for concerns of drainage from right breast incision site.  Denies fever, chills, nausea, vomiting, or other systemic signs of infection.         ROS:  Negative unless otherwise stated above in HPI    Objective:     Physical Exam:  Vitals:    07/17/24 1508   BP: 132/86   Pulse: 79       WD WN NAD  VSS  Normal resp effort  Breast: right breast incision with a small 2mm draining site.          Assessment:       No diagnosis found.    Plan:   59 y.o. female status post right breast washout and implant exchange 6/13/24.  - Apply antibiotic ointment daily to incision and clean breast TID with peroxide.   - Pt was seen and evaluated by myself and Dr. Brad Zavala.   - wound care discussed  - Return to clinic PRN, unless breast becomes more inflamed or drainage increases.      All questions were answered. The patient was advised to call the clinic with any questions or concerns prior to their next visit.       Inge Michael MD  Plastic and Reconstructive Surgery  [phone]

## 2024-08-14 ENCOUNTER — OFFICE VISIT (OUTPATIENT)
Dept: PLASTIC SURGERY | Facility: CLINIC | Age: 59
End: 2024-08-14
Payer: MEDICAID

## 2024-08-14 DIAGNOSIS — Z09 SURGERY FOLLOW-UP EXAMINATION: Primary | ICD-10-CM

## 2024-08-14 PROCEDURE — 4010F ACE/ARB THERAPY RXD/TAKEN: CPT | Mod: CPTII,,, | Performed by: SURGERY

## 2024-08-14 PROCEDURE — 99024 POSTOP FOLLOW-UP VISIT: CPT | Mod: ,,, | Performed by: SURGERY

## 2024-08-20 NOTE — PROGRESS NOTES
.  Presents to Plastic surgery after having breast reconstruction with implants.  She has had a chronic problem with the right expander.  She does have a small superficial wound medially.  There is no implant exposed.  She has had these before.  There is no sign of infection.  I will continue to follow-up with her on a regular basis.  This has been going on for quite a while in the had a long discussion with her that if this should continue much longer we simply just need to take the implant out.

## 2024-10-16 ENCOUNTER — OFFICE VISIT (OUTPATIENT)
Dept: PLASTIC SURGERY | Facility: CLINIC | Age: 59
End: 2024-10-16
Payer: MEDICAID

## 2024-10-16 VITALS
WEIGHT: 203.5 LBS | DIASTOLIC BLOOD PRESSURE: 60 MMHG | SYSTOLIC BLOOD PRESSURE: 122 MMHG | BODY MASS INDEX: 39.74 KG/M2 | HEART RATE: 73 BPM

## 2024-10-16 DIAGNOSIS — Z09 SURGERY FOLLOW-UP EXAMINATION: Primary | ICD-10-CM

## 2024-10-16 PROCEDURE — 1159F MED LIST DOCD IN RCRD: CPT | Mod: CPTII,,, | Performed by: SURGERY

## 2024-10-16 PROCEDURE — 99213 OFFICE O/P EST LOW 20 MIN: CPT | Mod: PBBFAC | Performed by: SURGERY

## 2024-10-16 PROCEDURE — 3078F DIAST BP <80 MM HG: CPT | Mod: CPTII,,, | Performed by: SURGERY

## 2024-10-16 PROCEDURE — 3074F SYST BP LT 130 MM HG: CPT | Mod: CPTII,,, | Performed by: SURGERY

## 2024-10-16 PROCEDURE — 99999 PR PBB SHADOW E&M-EST. PATIENT-LVL III: CPT | Mod: PBBFAC,,, | Performed by: SURGERY

## 2024-10-16 PROCEDURE — 4010F ACE/ARB THERAPY RXD/TAKEN: CPT | Mod: CPTII,,, | Performed by: SURGERY

## 2024-10-16 PROCEDURE — 99024 POSTOP FOLLOW-UP VISIT: CPT | Mod: ,,, | Performed by: SURGERY

## 2024-10-22 NOTE — PROGRESS NOTES
Patient presents Plastic surgery Clinic after having bilateral breast reconstruction.  She has had multiple problems with chronic seromas.  She has done well recently.  We would like to up size on her implants.  I think we can go ahead and arrange that.  She also needs to have bilateral capsulorrhaphies.  I think we would use [employer] from that we will need to contract the rep

## 2024-11-19 ENCOUNTER — PATIENT MESSAGE (OUTPATIENT)
Dept: PLASTIC SURGERY | Facility: CLINIC | Age: 59
End: 2024-11-19
Payer: MEDICAID

## 2024-12-07 ENCOUNTER — PATIENT MESSAGE (OUTPATIENT)
Dept: PLASTIC SURGERY | Facility: CLINIC | Age: 59
End: 2024-12-07
Payer: MEDICAID

## 2024-12-10 DIAGNOSIS — Z98.890 S/P BREAST RECONSTRUCTION: ICD-10-CM

## 2024-12-10 DIAGNOSIS — N64.89 RECURRENT SEROMA OF BREAST: ICD-10-CM

## 2024-12-10 DIAGNOSIS — C80.1 CANCER: Primary | ICD-10-CM

## 2025-01-14 RX ORDER — PHENTERMINE HYDROCHLORIDE 37.5 MG/1
37.5 TABLET ORAL DAILY
COMMUNITY
Start: 2024-12-06

## 2025-01-14 RX ORDER — ATORVASTATIN CALCIUM 20 MG/1
20 TABLET, FILM COATED ORAL DAILY
COMMUNITY
Start: 2024-12-10

## 2025-01-14 RX ORDER — FLUTICASONE PROPIONATE 50 MCG
1 SPRAY, SUSPENSION (ML) NASAL 2 TIMES DAILY
COMMUNITY
Start: 2024-10-29

## 2025-01-14 NOTE — ANESTHESIA PAT ROS NOTE
01/14/2025  Deanna Mcclain is a 59 y.o., female.      Pre-op Assessment    I have reviewed the NPO Status.   I have reviewed the Medications.     Review of Systems  Anesthesia Hx:    Pt reported h/o delayed waking/prolonged PACU stay years ago but no recent problems           Denies Family Hx of Anesthesia complications.   Personal Hx of Anesthesia complications          Slow To Awaken/Delayed Emergence and significant; extubation delayed; prolonged PACU stay          Social:  Non-Smoker, Former Smoker, No Alcohol Use       Hematology/Oncology:  Hematology Normal                       --  Cancer in past history:       Breast       chemotherapy and surgery   Oncology Comments: Hx of breast cancer s/p 2012 bilateral mastectomy and chemo     EENT/Dental:  EENT/Dental Normal           Cardiovascular:  Exercise tolerance: good   Denies Pacemaker. Hypertension, well controlled   Denies MI.        Denies Angina.          Patient not on beta blockers                          Pulmonary:   COPD, mild     Sleep Apnea, CPAP                Renal/:  Renal/ Normal                 Hepatic/GI:        Hx of gastric bypass surgery, h/o cholecystectomy             Musculoskeletal:       Bone Disorders:    Osteoporosis        Neurological:  Neurology Normal Denies TIA.  Denies CVA.    Denies Seizures.                                Endocrine:     Hx of benign adrenal nodule      Obesity / BMI > 30, Morbid Obesity / BMI > 40  Dermatological:  Skin Normal    Psych:  Psychiatric History anxiety  Anxiety, panic disorder                    Anesthesia Assessment: Preoperative EQUATION    Planned Procedure: Procedure(s) (LRB):  CAPSULOTOMY, BREAST (Bilateral)  REPLACEMENT, IMPLANT, BREAST (Bilateral)  Requested Anesthesia Type:General  Surgeon: Brad Zavala MD  Service: Plastics  Known or anticipated Date of  Surgery:1/27/2025    Surgeon notes: reviewed    Electronic QUestionnaire Assessment completed via nurse interview with patient.        Triage considerations:     The patient has no apparent active cardiac condition (No unstable coronary Syndrome such as severe unstable angina or recent [<1 month] myocardial infarction, decompensated CHF, severe valvular   disease or significant arrhythmia)    Previous anesthesia records:GETA and No problems  06/13/24 WASHOUT right breast, removal of implant, possible replacement (Right) REPLACEMENT, IMPLANT, BREAST CAPSULOTOMY, BREAST (Chest) REMOVAL, GRAFT, general anesthesia, ASA 3  Intubation     Date/Time: 6/13/2024 12:43 PM     Performed by: Aurora Jose MD  Authorized by: Aurora Jose MD    Intubation:     Induction:  Rapid sequence induction    Intubated:  Postinduction    Mask Ventilation:  Not attempted    Attempts:  1    Attempted By:  Staff anesthesiologist    Method of Intubation:  Direct    Blade:  Dean 2    Laryngeal View Grade: Grade IIA - cords partially seen      Difficult Airway Encountered?: No      Complications:  None    Airway Device:  Oral endotracheal tube    Airway Device Size:  7.5    Style/Cuff Inflation:  Cuffed    Inflation Amount (mL):  7    Tube secured:  21    Secured at:  The teeth    Placement Verified By:  Capnometry    Complicating Factors:  None    Findings Post-Intubation:  BS equal bilateral and atraumatic/condition of teeth unchanged    Last PCP note:  none  Subspecialty notes:  Plastics    Other important co-morbidities: COPD, HTN, Morbid Obesity, Obesity, BAYLEE, and h/o breast cancer s/p 2012 bilateral mastectomy and chemo, s/p breast reconstruction, 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             Instructions given. (See in Nurse's note)    Optimization:  Anesthesia Preop Clinic Assessment Indicated:    --phone screen      Plan:    Testing:  BMP, EKG, and Hematology Profile    Navigation: Tests Scheduled.             Results will be tracked by Preop Clinic.

## 2025-01-24 ENCOUNTER — PATIENT MESSAGE (OUTPATIENT)
Dept: PLASTIC SURGERY | Facility: CLINIC | Age: 60
End: 2025-01-24
Payer: MEDICAID

## 2025-01-24 ENCOUNTER — TELEPHONE (OUTPATIENT)
Dept: PLASTIC SURGERY | Facility: CLINIC | Age: 60
End: 2025-01-24
Payer: MEDICAID

## 2025-01-24 ENCOUNTER — ANESTHESIA EVENT (OUTPATIENT)
Dept: SURGERY | Facility: HOSPITAL | Age: 60
End: 2025-01-24
Payer: MEDICAID

## 2025-01-24 NOTE — TELEPHONE ENCOUNTER
Attempted to contact pt. Pt unavailable. [facility] with the following info: arrival time of 5:30am for surgery. Pt informed not to eat or drink anything after midnight. Pt instructed to take a good shower or bath and wear loose comfortable clothing. All questions answered.

## 2025-01-24 NOTE — ANESTHESIA PREPROCEDURE EVALUATION
[facility]  Anesthesia Pre-Operative Evaluation        Patient Name: Deanna Mcclain  YOB: 1965  MRN: [id number]    SUBJECTIVE:     Pre-operative Evaluation for Procedure(s) (LRB):  CAPSULOTOMY, BREAST (Bilateral)  REPLACEMENT, IMPLANT, BREAST (Bilateral)     01/24/2025    Deanna Mcclain is a 59 y.o. female with a PMHx significant for hx of breast cancer requiring breast reconstruction.     The patient now presents for the above procedure(s).    Previous Airway  Intubation     Date/Time: 6/13/2024 12:43 PM     Performed by: Aurora Jose MD  Authorized by: Aurora Jose MD    Intubation:     Induction:  Rapid sequence induction    Intubated:  Postinduction    Mask Ventilation:  Not attempted    Attempts:  1    Attempted By:  Staff anesthesiologist    Method of Intubation:  Direct    Blade:  Dean 2    Laryngeal View Grade: Grade IIA - cords partially seen      Difficult Airway Encountered?: No      Complications:  None    Airway Device:  Oral endotracheal tube    Airway Device Size:  7.5    Style/Cuff Inflation:  Cuffed    Inflation Amount (mL):  7    Tube secured:  21    Secured at:  The teeth    Placement Verified By:  Capnometry    Complicating Factors:  None    Findings Post-Intubation:  BS equal bilateral and atraumatic/condition of teeth unchanged    Patient Active Problem List   Diagnosis    Adrenal benign neoplasm    Complication of breast implant       Past Medical History:   Diagnosis Date    Adrenal benign neoplasm     bilateral, left 9 mm right 11 mm on CT 5-2015    Breast carcinoma     COPD (chronic obstructive pulmonary disease)     HTN (hypertension), benign     DAQUAN on CPAP     Osteoporosis     Panic disorder     Vitamin D deficiency        Review of patient's allergies indicates:  No Known Allergies    Current Outpatient Medications   Medication Instructions    albuterol (PROVENTIL/VENTOLIN HFA) 90 mcg/actuation inhaler 2 puffs    albuterol  (PROVENTIL/VENTOLIN HFA) 90 mcg/actuation inhaler No dose, route, or frequency recorded.    alendronate (FOSAMAX) 70 MG tablet TAKE ONE TABLET BY MOUTH EVERY WEEK AS DIRECTED    ALPRAZolam (XANAX) 0.5 mg    amLODIPine (NORVASC) 5 mg, Daily    atorvastatin (LIPITOR) 20 mg, Daily    buPROPion (WELLBUTRIN SR) 150 mg, 2 times daily    busPIRone (BUSPAR) 7.5 mg, 3 times daily    cholecalciferol (vitamin D3) 50,000 Units, Every 7 days    clobetasol 0.05% (TEMOVATE) 0.05 % Oint APPLY OINTMENT TOPICALLY TO AFFECTED AREA AS NEEDED    ergocalciferol (ERGOCALCIFEROL) 50,000 Units    EScitalopram oxalate (LEXAPRO) 20 mg, Daily    fluticasone propionate (FLONASE) 50 mcg/actuation nasal spray 1 spray, 2 times daily    gabapentin (NEURONTIN) 300 mg, 3 times daily    INCRUSE ELLIPTA 62.5 mcg/actuation DsDv Daily    linaCLOtide (LINZESS) 145 mcg    lisinopril-hydrochlorothiazide (PRINZIDE,ZESTORETIC) 20-12.5 mg per tablet 1 tablet, Daily    LORazepam (ATIVAN) 0.5 MG tablet TAKE 1 TABLET BY MOUTH TWICE DAILY. MUST SEE DOCTOR FOR FURTHER REFILLS    NON FORMULARY MEDICATION CPAP    omeprazole (PRILOSEC) 20 mg, Daily    oxyCODONE (ROXICODONE) 5 mg, Oral, Every 4 hours PRN    phentermine (ADIPEX-P) 37.5 mg, Daily    promethazine (PHENERGAN) 25 mg    traMADoL (ULTRAM) 50 mg, 3 times daily PRN       Past Surgical History:   Procedure Laterality Date    BREAST CAPSULOTOMY  6/13/2024    Procedure: CAPSULOTOMY, BREAST;  Surgeon: Brad Zavala MD;  Location: [facility] OR [address] [place] [address];  Service: Plastics;;    BREAST RECONSTRUCTION Bilateral 2/29/2024    Procedure: RECONSTRUCTION, BREAST IMPLANT BASED;  Surgeon: Brad Zavala MD;  Location: [facility] OR [address] [place] [address];  Service: Plastics;  Laterality: Bilateral;    BREAST SURGERY Bilateral 11/2012    CHOLECYSTECTOMY  2012    GASTRIC BYPASS  2008    Gastric Bypass Reversal  2012    HYSTERECTOMY      REMOVAL OF GRAFT  6/13/2024    Procedure: REMOVAL, GRAFT;  Surgeon: Brad Zavala MD;   "Location: [facility] OR [address] [place] [address];  Service: Plastics;;    REPLACEMENT OF IMPLANT OF BREAST  6/13/2024    Procedure: REPLACEMENT, IMPLANT, BREAST;  Surgeon: Brad Zavala MD;  Location: [facility] OR [address] [place] [address];  Service: Plastics;;    TOTAL ABDOMINAL HYSTERECTOMY W/ BILATERAL SALPINGOOPHORECTOMY  2001    Teratoma with hair and teeth    WASHOUT Right 6/13/2024    Procedure: WASHOUT right breast, removal of implant, possible replacement;  Surgeon: Brad Zavala MD;  Location: [address];  Service: Plastics;  Laterality: Right;       Social History     Substance and Sexual Activity   Drug Use Never     Alcohol Use: Not At Risk (6/14/2024)    AUDIT-C     Frequency of Alcohol Consumption: Monthly or less     Average Number of Drinks: 1 or 2     Frequency of Binge Drinking: Never     Tobacco Use: Medium Risk (10/16/2024)    Patient History     Smoking Tobacco Use: Former     Smokeless Tobacco Use: Unknown     Passive Exposure: Not on file       OBJECTIVE:     Vital Signs Range (Last 24H):         Significant Labs    Heme Profile  Lab Results   Component Value Date    WBC 6.45 06/13/2024    HGB 13.1 06/13/2024    HCT 40.0 06/13/2024     06/13/2024       Coagulation Studies  No results found for: "LABPROT", "INR", "APTT"    BMP  Lab Results   Component Value Date     06/13/2024    K 4.4 06/13/2024     06/13/2024    CO2 20 (L) 06/13/2024    BUN 15 06/13/2024    CREATININE 0.8 06/13/2024       Liver Function Tests  Lab Results   Component Value Date    AST 14 06/12/2024    ALT 21 06/12/2024    ALKPHOS 94 06/12/2024    BILITOT 0.4 06/12/2024    PROT 7.0 06/12/2024    ALBUMIN 3.4 (L) 06/12/2024       Lipid Profile  No results found for: "CHOL", "HDL", "LDLDIRECT", "TRIG"    Endocrine Profile  No results found for: "HGBA1C", "TSH"      Cardiac Studies    EKG:   Results for orders placed or performed during the hospital encounter of 02/28/24   EKG 12-lead    Collection Time: 02/28/24 11:13 AM "   Result Value Ref Range    QRS Duration 90 ms    [facility] QTC Calculation 450 ms    Narrative    Test Reason : Z01.818,I10,    Vent. Rate : 069 BPM     Atrial Rate : 069 BPM     P-R Int : 130 ms          QRS Dur : 090 ms      QT Int : 420 ms       P-R-T Axes : 062 017 036 degrees     QTc Int : 450 ms    Normal sinus rhythm  Nonspecific ST abnormality  Abnormal ECG  No previous ECGs available  Confirmed by Elizabeth Barron MD ([id number]) on 2/28/2024 3:09:35 PM    Referred By: DAVID JHA           Confirmed By:Elizabeth Barron MD       ADRIANA  No results found for this or any previous visit.      TTE  No results found for this or any previous visit.      Nuclear Stress Test ()  No results found for this or any previous visit.      ASSESSMENT/PLAN:           Pre-op Assessment    I have reviewed the NPO Status.   I have reviewed the Medications.     Review of Systems  Anesthesia Hx:    Pt reported h/o delayed waking/prolonged PACU stay years ago but no recent problems           Denies Family Hx of Anesthesia complications.   Personal Hx of Anesthesia complications          Slow To Awaken/Delayed Emergence and significant; extubation delayed; prolonged PACU stay          Social:  Non-Smoker, Former Smoker, No Alcohol Use       Hematology/Oncology:  Hematology Normal                       --  Cancer in past history:       Breast       chemotherapy and surgery   Oncology Comments: Hx of breast cancer s/p 2012 bilateral mastectomy and chemo     EENT/Dental:  EENT/Dental Normal           Cardiovascular:  Exercise tolerance: good   Denies Pacemaker. Hypertension, well controlled   Denies MI.        Denies Angina.          Patient not on beta blockers                          Pulmonary:   COPD, mild Asthma    Sleep Apnea, CPAP                Renal/:  Renal/ Normal                 Hepatic/GI:     GERD   Hx of gastric bypass surgery, h/o cholecystectomy             Musculoskeletal:       Bone Disorders:    Osteoporosis         Neurological:  Neurology Normal Denies TIA.  Denies CVA.    Denies Seizures.                                Endocrine:     Hx of benign adrenal nodule      Obesity / BMI > 30, Morbid Obesity / BMI > 40  Dermatological:  Skin Normal    Psych:  Psychiatric History anxiety  Anxiety, panic disorder               Physical Exam  General: Well nourished, Cooperative, Alert and Oriented    Airway:  Mallampati: II   Mouth Opening: Normal  TM Distance: Normal  Tongue: Normal  Neck ROM: Normal ROM    Dental:  Dentures, Partial Dentures        Anesthesia Plan  Type of Anesthesia, risks & benefits discussed:    Anesthesia Type: Gen ETT  Intra-op Monitoring Plan: Standard ASA Monitors  Post Op Pain Control Plan: multimodal analgesia and IV/PO Opioids PRN  Induction:  IV  Airway Plan: Direct and Video, Post-Induction  Informed Consent: Informed consent signed with the Patient and all parties understand the risks and agree with anesthesia plan.  All questions answered.   ASA Score: 3  Day of Surgery Review of History & Physical: H&P Update referred to the surgeon/provider.  Anesthesia Plan Notes: Severe GERD, h/o gastric bypass, plan to RSI.  COPD/Asthma, takes inhaler ~3x per week, took this morning.     Ready For Surgery From Anesthesia Perspective.     .

## 2025-01-27 ENCOUNTER — ANESTHESIA (OUTPATIENT)
Dept: SURGERY | Facility: HOSPITAL | Age: 60
End: 2025-01-27
Payer: MEDICAID

## 2025-01-27 ENCOUNTER — HOSPITAL ENCOUNTER (OUTPATIENT)
Facility: HOSPITAL | Age: 60
Discharge: HOME | End: 2025-01-27
Attending: SURGERY | Admitting: SURGERY
Payer: MEDICAID

## 2025-01-27 VITALS
HEART RATE: 69 BPM | BODY MASS INDEX: 36.29 KG/M2 | HEIGHT: 59 IN | OXYGEN SATURATION: 97 % | RESPIRATION RATE: 16 BRPM | SYSTOLIC BLOOD PRESSURE: 130 MMHG | TEMPERATURE: 99 F | DIASTOLIC BLOOD PRESSURE: 74 MMHG | WEIGHT: 180 LBS

## 2025-01-27 DIAGNOSIS — T85.9XXA COMPLICATION OF BREAST IMPLANT: Primary | ICD-10-CM

## 2025-01-27 DIAGNOSIS — Z01.818 PREOP TESTING: ICD-10-CM

## 2025-01-27 LAB
ANION GAP SERPL CALC-SCNC: 8 MMOL/L (ref 8–16)
BASOPHILS # BLD AUTO: 0.1 K/UL (ref 0–0.2)
BASOPHILS NFR BLD: 1.4 % (ref 0–1.9)
BUN SERPL-MCNC: 17 MG/DL (ref 6–20)
CALCIUM SERPL-MCNC: 9.7 MG/DL (ref 8.7–10.5)
CHLORIDE SERPL-SCNC: 109 MMOL/L (ref 95–110)
CO2 SERPL-SCNC: 25 MMOL/L (ref 23–29)
CREAT SERPL-MCNC: 0.8 MG/DL (ref 0.5–1.4)
DIFFERENTIAL METHOD BLD: ABNORMAL
EOSINOPHIL # BLD AUTO: 0.2 K/UL (ref 0–0.5)
EOSINOPHIL NFR BLD: 2.8 % (ref 0–8)
ERYTHROCYTE [DISTWIDTH] IN BLOOD BY AUTOMATED COUNT: 12.7 % (ref 11.5–14.5)
EST. GFR  (NO RACE VARIABLE): >60 ML/MIN/1.73 M^2
FINAL PATHOLOGIC DIAGNOSIS: NORMAL
GLUCOSE SERPL-MCNC: 106 MG/DL (ref 70–110)
GROSS: NORMAL
HCT VFR BLD AUTO: 46.6 % (ref 37–48.5)
HGB BLD-MCNC: 15.4 G/DL (ref 12–16)
IMM GRANULOCYTES # BLD AUTO: 0.02 K/UL (ref 0–0.04)
IMM GRANULOCYTES NFR BLD AUTO: 0.3 % (ref 0–0.5)
LYMPHOCYTES # BLD AUTO: 1.6 K/UL (ref 1–4.8)
LYMPHOCYTES NFR BLD: 22 % (ref 18–48)
Lab: NORMAL
MCH RBC QN AUTO: 31.8 PG (ref 27–31)
MCHC RBC AUTO-ENTMCNC: 33 G/DL (ref 32–36)
MCV RBC AUTO: 96 FL (ref 82–98)
MONOCYTES # BLD AUTO: 0.7 K/UL (ref 0.3–1)
MONOCYTES NFR BLD: 10.2 % (ref 4–15)
NEUTROPHILS # BLD AUTO: 4.5 K/UL (ref 1.8–7.7)
NEUTROPHILS NFR BLD: 63.3 % (ref 38–73)
NRBC BLD-RTO: 0 /100 WBC
OHS QRS DURATION: 88 MS
OHS QTC CALCULATION: 448 MS
PLATELET # BLD AUTO: 186 K/UL (ref 150–450)
PMV BLD AUTO: 10.2 FL (ref 9.2–12.9)
POTASSIUM SERPL-SCNC: 3.6 MMOL/L (ref 3.5–5.1)
RBC # BLD AUTO: 4.85 M/UL (ref 4–5.4)
SODIUM SERPL-SCNC: 142 MMOL/L (ref 136–145)
WBC # BLD AUTO: 7.08 K/UL (ref 3.9–12.7)

## 2025-01-27 PROCEDURE — C1789 PROSTHESIS, BREAST, IMP: HCPCS | Performed by: SURGERY

## 2025-01-27 PROCEDURE — 93010 ELECTROCARDIOGRAM REPORT: CPT | Mod: ,,, | Performed by: STUDENT IN AN ORGANIZED HEALTH CARE EDUCATION/TRAINING PROGRAM

## 2025-01-27 PROCEDURE — 71000015 HC POSTOP RECOV 1ST HR: Performed by: SURGERY

## 2025-01-27 PROCEDURE — 88300 SURGICAL PATH GROSS: CPT | Mod: 59 | Performed by: STUDENT IN AN ORGANIZED HEALTH CARE EDUCATION/TRAINING PROGRAM

## 2025-01-27 PROCEDURE — 25000003 PHARM REV CODE 250: Performed by: STUDENT IN AN ORGANIZED HEALTH CARE EDUCATION/TRAINING PROGRAM

## 2025-01-27 PROCEDURE — 36000710: Performed by: SURGERY

## 2025-01-27 PROCEDURE — 19342 INSJ/RPLCMT BRST IMPLT SEP D: CPT | Mod: 50,,, | Performed by: SURGERY

## 2025-01-27 PROCEDURE — 63600175 PHARM REV CODE 636 W HCPCS: Performed by: STUDENT IN AN ORGANIZED HEALTH CARE EDUCATION/TRAINING PROGRAM

## 2025-01-27 PROCEDURE — 25000003 PHARM REV CODE 250

## 2025-01-27 PROCEDURE — 80048 BASIC METABOLIC PNL TOTAL CA: CPT | Performed by: ANESTHESIOLOGY

## 2025-01-27 PROCEDURE — 88300 SURGICAL PATH GROSS: CPT | Mod: 26,,, | Performed by: STUDENT IN AN ORGANIZED HEALTH CARE EDUCATION/TRAINING PROGRAM

## 2025-01-27 PROCEDURE — 37000008 HC ANESTHESIA 1ST 15 MINUTES: Performed by: SURGERY

## 2025-01-27 PROCEDURE — 85025 COMPLETE CBC W/AUTO DIFF WBC: CPT | Performed by: ANESTHESIOLOGY

## 2025-01-27 PROCEDURE — 27201423 OPTIME MED/SURG SUP & DEVICES STERILE SUPPLY: Performed by: SURGERY

## 2025-01-27 PROCEDURE — D9220A PRA ANESTHESIA: Mod: ,,, | Performed by: STUDENT IN AN ORGANIZED HEALTH CARE EDUCATION/TRAINING PROGRAM

## 2025-01-27 PROCEDURE — 37000009 HC ANESTHESIA EA ADD 15 MINS: Performed by: SURGERY

## 2025-01-27 PROCEDURE — C1729 CATH, DRAINAGE: HCPCS | Performed by: SURGERY

## 2025-01-27 PROCEDURE — 71000044 HC DOSC ROUTINE RECOVERY FIRST HOUR: Performed by: SURGERY

## 2025-01-27 PROCEDURE — 36000711: Performed by: SURGERY

## 2025-01-27 PROCEDURE — 63600175 PHARM REV CODE 636 W HCPCS

## 2025-01-27 PROCEDURE — 93005 ELECTROCARDIOGRAM TRACING: CPT

## 2025-01-27 DEVICE — NATRELLE INSPIRA SSX 700CC EXTRA FULL PROFILE  SMOOTH ROUND SILICONE
Type: IMPLANTABLE DEVICE | Site: BREAST | Status: FUNCTIONAL
Brand: NATRELLE INSPIRA SOFTTOUCH BREAST IMPLANTS

## 2025-01-27 RX ORDER — HYDROMORPHONE HYDROCHLORIDE 1 MG/ML
0.2 INJECTION, SOLUTION INTRAMUSCULAR; INTRAVENOUS; SUBCUTANEOUS EVERY 5 MIN PRN
Status: DISCONTINUED | OUTPATIENT
Start: 2025-01-27 | End: 2025-01-27 | Stop reason: HOSPADM

## 2025-01-27 RX ORDER — MUPIROCIN 20 MG/G
OINTMENT TOPICAL
Status: DISCONTINUED | OUTPATIENT
Start: 2025-01-27 | End: 2025-01-27 | Stop reason: HOSPADM

## 2025-01-27 RX ORDER — OXYCODONE AND ACETAMINOPHEN 5; 325 MG/1; MG/1
1 TABLET ORAL
Status: DISCONTINUED | OUTPATIENT
Start: 2025-01-27 | End: 2025-01-27 | Stop reason: HOSPADM

## 2025-01-27 RX ORDER — ONDANSETRON HYDROCHLORIDE 2 MG/ML
INJECTION, SOLUTION INTRAVENOUS
Status: DISCONTINUED | OUTPATIENT
Start: 2025-01-27 | End: 2025-01-27

## 2025-01-27 RX ORDER — LIDOCAINE HYDROCHLORIDE 40 MG/ML
SOLUTION TOPICAL
Status: DISCONTINUED | OUTPATIENT
Start: 2025-01-27 | End: 2025-01-27

## 2025-01-27 RX ORDER — EPHEDRINE SULFATE 50 MG/ML
INJECTION, SOLUTION INTRAVENOUS
Status: DISCONTINUED | OUTPATIENT
Start: 2025-01-27 | End: 2025-01-27

## 2025-01-27 RX ORDER — GLUCAGON 1 MG
1 KIT INJECTION
Status: DISCONTINUED | OUTPATIENT
Start: 2025-01-27 | End: 2025-01-27 | Stop reason: HOSPADM

## 2025-01-27 RX ORDER — PROPOFOL 10 MG/ML
VIAL (ML) INTRAVENOUS
Status: DISCONTINUED | OUTPATIENT
Start: 2025-01-27 | End: 2025-01-27

## 2025-01-27 RX ORDER — FENTANYL CITRATE 50 UG/ML
INJECTION, SOLUTION INTRAMUSCULAR; INTRAVENOUS
Status: DISCONTINUED | OUTPATIENT
Start: 2025-01-27 | End: 2025-01-27

## 2025-01-27 RX ORDER — LIDOCAINE HYDROCHLORIDE 20 MG/ML
INJECTION, SOLUTION EPIDURAL; INFILTRATION; INTRACAUDAL; PERINEURAL
Status: DISCONTINUED | OUTPATIENT
Start: 2025-01-27 | End: 2025-01-27

## 2025-01-27 RX ORDER — SUCCINYLCHOLINE CHLORIDE 20 MG/ML
INJECTION INTRAMUSCULAR; INTRAVENOUS
Status: DISCONTINUED | OUTPATIENT
Start: 2025-01-27 | End: 2025-01-27

## 2025-01-27 RX ORDER — ROCURONIUM BROMIDE 10 MG/ML
INJECTION, SOLUTION INTRAVENOUS
Status: DISCONTINUED | OUTPATIENT
Start: 2025-01-27 | End: 2025-01-27

## 2025-01-27 RX ORDER — LIDOCAINE HYDROCHLORIDE 10 MG/ML
1 INJECTION, SOLUTION EPIDURAL; INFILTRATION; INTRACAUDAL; PERINEURAL ONCE
Status: DISCONTINUED | OUTPATIENT
Start: 2025-01-27 | End: 2025-01-27 | Stop reason: HOSPADM

## 2025-01-27 RX ORDER — MIDAZOLAM HYDROCHLORIDE 5 MG/ML
INJECTION INTRAMUSCULAR; INTRAVENOUS
Status: DISCONTINUED | OUTPATIENT
Start: 2025-01-27 | End: 2025-01-27

## 2025-01-27 RX ORDER — KETAMINE HCL IN 0.9 % NACL 50 MG/5 ML
SYRINGE (ML) INTRAVENOUS
Status: DISCONTINUED | OUTPATIENT
Start: 2025-01-27 | End: 2025-01-27

## 2025-01-27 RX ORDER — PHENYLEPHRINE HCL IN 0.9% NACL 1 MG/10 ML
SYRINGE (ML) INTRAVENOUS
Status: DISCONTINUED | OUTPATIENT
Start: 2025-01-27 | End: 2025-01-27

## 2025-01-27 RX ORDER — DEXAMETHASONE SODIUM PHOSPHATE 4 MG/ML
INJECTION, SOLUTION INTRA-ARTICULAR; INTRALESIONAL; INTRAMUSCULAR; INTRAVENOUS; SOFT TISSUE
Status: DISCONTINUED | OUTPATIENT
Start: 2025-01-27 | End: 2025-01-27

## 2025-01-27 RX ORDER — CEPHALEXIN 500 MG/1
500 CAPSULE ORAL EVERY 6 HOURS
Qty: 20 CAPSULE | Refills: 0 | Status: SHIPPED | OUTPATIENT
Start: 2025-01-27 | End: 2025-02-01

## 2025-01-27 RX ORDER — SODIUM CHLORIDE 0.9 % (FLUSH) 0.9 %
10 SYRINGE (ML) INJECTION
Status: DISCONTINUED | OUTPATIENT
Start: 2025-01-27 | End: 2025-01-27 | Stop reason: HOSPADM

## 2025-01-27 RX ORDER — CEFAZOLIN 2 G/1
2 INJECTION, POWDER, FOR SOLUTION INTRAMUSCULAR; INTRAVENOUS
Status: COMPLETED | OUTPATIENT
Start: 2025-01-27 | End: 2025-01-27

## 2025-01-27 RX ORDER — MIDAZOLAM HYDROCHLORIDE 1 MG/ML
INJECTION INTRAMUSCULAR; INTRAVENOUS
Status: DISCONTINUED | OUTPATIENT
Start: 2025-01-27 | End: 2025-01-27

## 2025-01-27 RX ORDER — HALOPERIDOL 5 MG/ML
0.5 INJECTION INTRAMUSCULAR EVERY 10 MIN PRN
Status: DISCONTINUED | OUTPATIENT
Start: 2025-01-27 | End: 2025-01-27 | Stop reason: HOSPADM

## 2025-01-27 RX ORDER — OXYCODONE AND ACETAMINOPHEN 5; 325 MG/1; MG/1
1 TABLET ORAL EVERY 6 HOURS PRN
Qty: 20 TABLET | Refills: 0 | Status: SHIPPED | OUTPATIENT
Start: 2025-01-27

## 2025-01-27 RX ADMIN — Medication 20 MG: at 07:01

## 2025-01-27 RX ADMIN — LIDOCAINE HYDROCHLORIDE 100 MG: 20 INJECTION, SOLUTION EPIDURAL; INFILTRATION; INTRACAUDAL at 07:01

## 2025-01-27 RX ADMIN — SODIUM CHLORIDE: 0.9 INJECTION, SOLUTION INTRAVENOUS at 07:01

## 2025-01-27 RX ADMIN — Medication 20 MG: at 08:01

## 2025-01-27 RX ADMIN — ROCURONIUM BROMIDE 40 MG: 10 INJECTION INTRAVENOUS at 08:01

## 2025-01-27 RX ADMIN — SUCCINYLCHOLINE CHLORIDE 100 MG: 20 INJECTION, SOLUTION INTRAMUSCULAR; INTRAVENOUS; PARENTERAL at 07:01

## 2025-01-27 RX ADMIN — OXYCODONE HYDROCHLORIDE AND ACETAMINOPHEN 1 TABLET: 5; 325 TABLET ORAL at 09:01

## 2025-01-27 RX ADMIN — PROPOFOL 150 MG: 10 INJECTION, EMULSION INTRAVENOUS at 07:01

## 2025-01-27 RX ADMIN — Medication 100 MCG: at 07:01

## 2025-01-27 RX ADMIN — MUPIROCIN: 20 OINTMENT TOPICAL at 06:01

## 2025-01-27 RX ADMIN — CEFAZOLIN 2 G: 330 INJECTION, POWDER, FOR SOLUTION INTRAMUSCULAR; INTRAVENOUS at 07:01

## 2025-01-27 RX ADMIN — FENTANYL CITRATE 100 MCG: 50 INJECTION, SOLUTION INTRAMUSCULAR; INTRAVENOUS at 07:01

## 2025-01-27 RX ADMIN — SUGAMMADEX 400 MG: 100 INJECTION, SOLUTION INTRAVENOUS at 09:01

## 2025-01-27 RX ADMIN — MIDAZOLAM HYDROCHLORIDE 2 MG: 2 INJECTION, SOLUTION INTRAMUSCULAR; INTRAVENOUS at 07:01

## 2025-01-27 RX ADMIN — DEXAMETHASONE SODIUM PHOSPHATE 4 MG: 4 INJECTION INTRA-ARTICULAR; INTRALESIONAL; INTRAMUSCULAR; INTRAVENOUS; SOFT TISSUE at 07:01

## 2025-01-27 RX ADMIN — ONDANSETRON 4 MG: 2 INJECTION INTRAMUSCULAR; INTRAVENOUS at 09:01

## 2025-01-27 RX ADMIN — ROCURONIUM BROMIDE 10 MG: 10 INJECTION INTRAVENOUS at 07:01

## 2025-01-27 RX ADMIN — Medication 10 MG: at 08:01

## 2025-01-27 RX ADMIN — LIDOCAINE HYDROCHLORIDE 4 ML: 40 SOLUTION TOPICAL at 07:01

## 2025-01-27 RX ADMIN — EPHEDRINE SULFATE 10 MG: 50 INJECTION INTRAVENOUS at 07:01

## 2025-01-27 NOTE — ANESTHESIA POSTPROCEDURE EVALUATION
Anesthesia Post Evaluation    Patient: Deanna Mcclain    Procedure(s) Performed: Procedure(s) (LRB):  CAPSULORRHAPHY (Bilateral)  REPLACEMENT, IMPLANT, BREAST (Bilateral)    Final Anesthesia Type: general      Patient location during evaluation: PACU  Patient participation: Yes- Able to Participate  Level of consciousness: awake and alert  Post-procedure vital signs: reviewed and stable  Pain management: adequate  Airway patency: patent  DAQUAN mitigation strategies: Multimodal analgesia  PONV status at discharge: No PONV  Anesthetic complications: no      Cardiovascular status: blood pressure returned to baseline, hemodynamically stable and stable  Respiratory status: unassisted, room air and spontaneous ventilation  Hydration status: euvolemic  Follow-up not needed.              Vitals Value Taken Time   /74 01/27/25 1000   Temp 37.2 °C (98.9 °F) 01/27/25 1000   Pulse 69 01/27/25 1000   Resp 16 01/27/25 1000   SpO2 97 % 01/27/25 1000         No case tracking events are documented in the log.      Pain/Lowell Score: Pain Rating Prior to Med Admin: 9 (1/27/2025  9:31 AM)  Lowell Score: 9 (1/27/2025  9:45 AM)

## 2025-01-27 NOTE — H&P
Plastic and Reconstructive Surgery   H&P    Date:   01/27/2025    History of Present Illness:    59 y.o. female who presents for bilateral breast implant exchange and bilateral capsulorrhaphies.      The Patient and/or Family denies any new medical diagnosis, hospitalizations, illness, or sickness recently since last clinic appointment.  There is no change in H&P since last office visit. Will proceed with planned procedure.      Past Medical History:    has a past medical history of Adrenal benign neoplasm, Breast carcinoma, COPD (chronic obstructive pulmonary disease), HTN (hypertension), benign, DAQUAN on CPAP, Osteoporosis, Panic disorder, and Vitamin D deficiency.    Past Surgical History:    has a past surgical history that includes Gastric bypass (2008); Gastric Bypass Reversal (2012); Breast surgery (Bilateral, 11/2012); Cholecystectomy (2012); Total abdominal hysterectomy w/ bilateral salpingoophorectomy (2001); Hysterectomy; Breast reconstruction (Bilateral, 2/29/2024); washout (Right, 6/13/2024); Replacement of implant of breast (6/13/2024); Breast capsulotomy (6/13/2024); and Removal of graft (6/13/2024).    Social History:  Social History     Tobacco Use    Smoking status: Former     Current packs/day: 0.00     Average packs/day: 1 pack/day for 30.0 years (30.0 ttl pk-yrs)     Types: Cigarettes     Start date: 11/3/1979     Quit date: 11/3/2009     Years since quitting: 15.2    Smokeless tobacco: Not on file   Substance Use Topics    Alcohol use: Not Currently     Social History     Substance and Sexual Activity   Drug Use Never       Family History:  Family History   Problem Relation Name Age of Onset    Ovarian cancer Mother      Uterine cancer Mother      COPD Brother      Breast cancer Maternal Grandmother      COPD Maternal Grandfather      Breast cancer Paternal Grandmother      Lung cancer Paternal Grandfather      Breast cancer Maternal Aunt         Allergies:  Review of patient's allergies  "indicates:  No Known Allergies    Home Medications:  Scheduled Meds:   LIDOcaine (PF) 10 mg/ml (1%)  1 mL Intradermal Once     Continuous Infusions:  PRN Meds:.  Current Facility-Administered Medications:     ceFAZolin (Ancef) IV (PEDS and ADULTS), 2 g, Intravenous, On Call Procedure    mupirocin, , Nasal, On Call Procedure    sodium chloride 0.9%, 10 mL, Intravenous, PRN      Review of Systems:  Negative except for what is noted in HPI    Physical Exam:  VITAL SIGNS:   Vitals:    25 0627 25   BP: 131/73    BP Location: Left arm    Patient Position: Sitting    Pulse: 62 60   Resp: 18    Temp: 97.9 °F (36.6 °C)    TempSrc: Skin    SpO2: 98%    Weight: 81.6 kg (180 lb)    Height: 4' 11" (1.499 m)      TMAX: Temp (24hrs), Av.9 °F (36.6 °C), Min:97.9 °F (36.6 °C), Max:97.9 °F (36.6 °C)      General: Alert; No acute distress  Cardiovascular: Regular rate   Respiratory: Normal respiratory effort. Chest rise symmetric.   Abdomen: Soft, nontender, nondistended  Extremity: Moves all extremities equally.  Neurologic: No focal deficit. Speech normal         Diagnostic Data:  No results found for this or any previous visit (from the past 2 weeks).  No results found for this or any previous visit (from the past 2 weeks).  Lab Results   Component Value Date    ALBUMIN 3.4 (L) 2024     Lab Results   Component Value Date    CRP 53.9 (H) 2024     No results found for: "INR", "PROTIME"  No results found for: "PTT"    Microbiology Results (last 7 days)       ** No results found for the last 168 hours. **            Assessment:  59 y.o.female presenting for bilateral breast implant exchange and bilateral capsulorrhaphies.    Plan:  Plan for bilateral breast implant exchange and bilateral capsulorrhaphies in OR  Consent obtained    Discussed with patient and/or family the risks and benefits of surgical intervention.  Conservative measures and alternative options were discussed and  the patient and/or " family would like to proceed with surgery.      We have discussed risks, which include but are not limited to bleeding, hematoma, seroma, infection, damage to nearby structures, blood clots in the legs that can travel to the lungs (pulmonary embolism). Pulmonary embolism can cause shortness of breath, chest pain, and even shock. Other risks include urinary tract infection, nausea and vomiting (usually related to pain medication), chronic pain, nerve damage, blood vessel injury, scarring and infection, which can require re-operation. Furthermore, the risks of anesthesia include potential heart, lung, kidney, and liver damage.  Informed consent was obtained.  The patient and or family understands and would like to proceed with surgery.    Madison Leon MD

## 2025-01-27 NOTE — BRIEF OP NOTE
Brief Operative Note     SUMMARY     Surgery Date: 1/27/2025     Surgeons and Role:     * Brad Zavala MD - Primary     * Madison Leon MD - Resident - Assisting     * Jayson Mcdonald MD - Fellow        Pre-op Diagnosis:  Cancer [C80.1]  Recurrent seroma of breast [N64.89]  S/P breast reconstruction [Z98.890]    Post-op Diagnosis:  Cancer [C80.1]  Recurrent seroma of breast [N64.89]  S/P breast reconstruction [Z98.890]    Procedure(s) (LRB):  CAPSULOTOMY, BREAST (Bilateral)  REPLACEMENT, IMPLANT, BREAST (Bilateral)  CAPSULORRHAPHY, SHOULDER, ANTERIOR, PUTTI KAMI OR SAFIA    Anesthesia: General    Description of Procedure:   Bilateral implant exchange, bilateral capsullorapphy, 700cc smooth silicone    Findings/Key Components:  See op note    Estimated Blood Loss: less than 50 mL         Specimens Removed:   Specimen (24h ago, onward)       Start     Ordered    01/27/25 0904  Specimen to Pathology, Surgery Breast  Once        Comments: Pre-op Diagnosis: Cancer [C80.1]Recurrent seroma of breast [N64.89]S/P breast reconstruction [Z98.890]Procedure(s):CAPSULOTOMY, BREASTREPLACEMENT, IMPLANT, BREASTCAPSULORRHAPHY, SHOULDER, ANTERIOR, PUTTI KAMI OR SAFIA Number of specimens: 2Name of specimens: 1.) Left breast implant- Gross only.2.) Right breast implants- Gross only.     References:    Click here for ordering Quick Tip   Question Answer Comment   Procedure Type: Breast    Release to patient Immediate        01/27/25 0903                    Discharge Note      SUMMARY     Admit Date: 1/27/2025    Attending Physician: Brad Zavala,*     Discharge Physician: Brad Zavala,*    Discharge Date: 1/27/2025     Final Diagnosis: Cancer [C80.1]  Recurrent seroma of breast [N64.89]  S/P breast reconstruction [Z98.890]    Hospital Course: Patient was admitted for an outpatient procedure and tolerated the procedure well with no complications.    Disposition: Home or Self Care    Follow  Up/Patient Instructions:   Current Discharge Medication List        CONTINUE these medications which have NOT CHANGED    Details   !! albuterol (PROVENTIL/VENTOLIN HFA) 90 mcg/actuation inhaler Inhale 2 puffs into the lungs.      atorvastatin (LIPITOR) 20 MG tablet Take 20 mg by mouth once daily.      busPIRone (BUSPAR) 7.5 MG tablet Take 7.5 mg by mouth 3 (three) times daily.      cholecalciferol, vitamin D3, 1,250 mcg (50,000 unit) capsule Take 50,000 Units by mouth every 7 days.      clobetasol 0.05% (TEMOVATE) 0.05 % Oint APPLY OINTMENT TOPICALLY TO AFFECTED AREA AS NEEDED  Qty: 15 g, Refills: 0      ergocalciferol (ERGOCALCIFEROL) 50,000 unit Cap Take 50,000 Units by mouth.      escitalopram oxalate (LEXAPRO) 20 MG tablet Take 20 mg by mouth once daily.      fluticasone propionate (FLONASE) 50 mcg/actuation nasal spray 1 spray by Each Nostril route 2 (two) times daily.      gabapentin (NEURONTIN) 300 MG capsule Take 300 mg by mouth 3 (three) times daily.      INCRUSE ELLIPTA 62.5 mcg/actuation DsDv Take by mouth once daily.      linaCLOtide (LINZESS) 145 mcg Cap capsule Take 145 mcg by mouth.      lisinopril-hydrochlorothiazide (PRINZIDE,ZESTORETIC) 20-12.5 mg per tablet Take 1 tablet by mouth once daily.      NON FORMULARY MEDICATION CPAP      omeprazole (PRILOSEC) 20 MG capsule Take 20 mg by mouth once daily.      phentermine (ADIPEX-P) 37.5 mg tablet Take 37.5 mg by mouth once daily.      promethazine (PHENERGAN) 25 MG tablet Take 25 mg by mouth.      !! albuterol (PROVENTIL/VENTOLIN HFA) 90 mcg/actuation inhaler       alendronate (FOSAMAX) 70 MG tablet TAKE ONE TABLET BY MOUTH EVERY WEEK AS DIRECTED      ALPRAZolam (XANAX) 0.5 MG tablet Take 0.5 mg by mouth.      amLODIPine (NORVASC) 5 MG tablet Take 5 mg by mouth once daily.      buPROPion (WELLBUTRIN SR) 150 MG TBSR 12 hr tablet Take 150 mg by mouth 2 (two) times daily.      LORazepam (ATIVAN) 0.5 MG tablet TAKE 1 TABLET BY MOUTH TWICE DAILY. MUST SEE  DOCTOR FOR FURTHER REFILLS      oxyCODONE (ROXICODONE) 5 MG immediate release tablet Take 1 tablet (5 mg total) by mouth every 4 (four) hours as needed for Pain.  Qty: 18 tablet, Refills: 0    Comments: Quantity prescribed more than 7 day supply? No      traMADol (ULTRAM) 50 mg tablet Take 50 mg by mouth 3 (three) times daily as needed.    Comments: <!--EPICS-->Quantity prescribed more than 7 day supply? Press F2 and select one:[id number]<BR><!--EPICE-->       !! - Potential duplicate medications found. Please discuss with provider.          Discharge Procedure Orders (must include Diet, Follow-up, Activity)   Discharge Procedure Orders (must include Diet, Follow-up, Activity)   Basic Metabolic Panel   Standing Status: Future Standing Exp. Date: 03/15/26     CBC Without Differential   Standing Status: Future Standing Exp. Date: 03/15/26     EKG 12-lead   Standing Status: Future Standing Exp. Date: 01/14/26

## 2025-01-27 NOTE — OP NOTE
Date of surgery 01/27/2025   Preoperative diagnosis history of breast cancer  Postoperative diagnosis is same  Procedure performed   1. Bilateral implant exchange  2. Bilateral lateral capsulorrhaphy  Surgeon Javier   Anesthesia general  Complications none   Blood loss minimal   Drains none       The patient was evaluated in the preoperative holding area in the standing position markings were made for the procedure.  I discussed with the patient that I would enter her breast capsules laterally with my incision.  This would be through a previous incision.  Patient was taken to the operative room placed in supine position after adequate general anesthesia was prepped and draped in a normal sterile fashion lateral incision was then made it was deepened down through subcutaneous tissue.  The capsule was opened at a higher level the implant was then removed.  A similar removal of the implant was performed on the opposite side.    Next, temporary sizers were then used to determine the appropriate size it was determined that a proximally 700 cc implants would be what the patient had desired preoperatively in the largest that we could place.  Prior to placing the permanent implants a lateral capsulorrhaphy using a double layered 2-0 Prolene suture was made in each lateral gutter in order to close down the capsule.  The capsule was much wider on the right than the left but a bilateral capsulorrhaphy was performed in that fashion.  Patient was then re-prepped redraped the pockets were irrigated with a copious amounts of Vashe solution.  Two 700 cc smooth extra profile silicone implants were opened on the back table soaked in Vashe solution.  Gloves have been changed.  The implants were then placed in the pocket.  Both on the right and left side 700 cc.  The capsules were then closed using a running 2-0 Vicryl the skin was closed using 3-0 Monocryl followed by running 4-0 Monocryl subcuticular suture.  There were no  complications with this procedure end of dictation

## 2025-01-27 NOTE — TRANSFER OF CARE
"Anesthesia Transfer of Care Note    Patient: Deanna Mcclain    Procedure(s) Performed: Procedure(s) (LRB):  CAPSULORRHAPHY (Bilateral)  REPLACEMENT, IMPLANT, BREAST (Bilateral)    Patient location: PACU    Anesthesia Type: general    Transport from OR: Transported from OR on 6-10 L/min O2 by face mask with adequate spontaneous ventilation    Post pain: adequate analgesia    Post assessment: no apparent anesthetic complications    Post vital signs: stable    Level of consciousness: awake    Nausea/Vomiting: no nausea/vomiting    Complications: none    Transfer of care protocol was followed      Last vitals: Visit Vitals  /73 (BP Location: Left arm, Patient Position: Sitting)   Pulse 60   Temp 36.6 °C (97.9 °F) (Skin)   Resp 18   Ht 4' 11" (1.499 m)   Wt 81.6 kg (180 lb)   SpO2 98%   Breastfeeding No   BMI 36.36 kg/m²     "

## 2025-01-27 NOTE — DISCHARGE INSTRUCTIONS
Patient Instructions:     Shower in 48 hours   Drain care, record daily and bring logs to clinic  Bacitracin to nipples 3x/day  Wear bra at all times except for shower or to wash it   Call clinic with concerns/questions     Drain care: If you have drains  Empty drains twice daily or when half way full and record output.   Strip drains every 4 hours and as needed, ensure bulbs are collapsed.    Medications:  You have been prescribed an antibiotic and narcotic pain medication  You may also use over the counter extra strength Tylenol and take 500mg every 6 hours and 600mg of ibuprofen/advil every 8 hours to assist with pain control.   You do not need to use all the narcotic pain medication     Wound care:  You have glue on incisions.  Ok to shower and allow water to run down incisions.  No tubs or soaking incisions.  Guaze in bra for comfort only and can remove at any time.      Activity:  Need to be out of bed (walking or in a chair) for >8h per day.  No strenuous activity, No lifting >15lbs.     Driving:  No driving while on narcotics.    Follow up:  See Dr Zavala in clinic in one week for follow up. Please call [phone]  for appointment.

## 2025-02-05 ENCOUNTER — OFFICE VISIT (OUTPATIENT)
Dept: PLASTIC SURGERY | Facility: CLINIC | Age: 60
End: 2025-02-05
Payer: MEDICAID

## 2025-02-05 VITALS — HEART RATE: 56 BPM | DIASTOLIC BLOOD PRESSURE: 71 MMHG | SYSTOLIC BLOOD PRESSURE: 122 MMHG

## 2025-02-05 DIAGNOSIS — Z09 SURGERY FOLLOW-UP EXAMINATION: Primary | ICD-10-CM

## 2025-02-05 PROCEDURE — 99024 POSTOP FOLLOW-UP VISIT: CPT | Mod: ,,, | Performed by: SURGERY

## 2025-02-05 PROCEDURE — 3074F SYST BP LT 130 MM HG: CPT | Mod: CPTII,,, | Performed by: SURGERY

## 2025-02-05 PROCEDURE — 99213 OFFICE O/P EST LOW 20 MIN: CPT | Mod: PBBFAC | Performed by: SURGERY

## 2025-02-05 PROCEDURE — 1159F MED LIST DOCD IN RCRD: CPT | Mod: CPTII,,, | Performed by: SURGERY

## 2025-02-05 PROCEDURE — 4010F ACE/ARB THERAPY RXD/TAKEN: CPT | Mod: CPTII,,, | Performed by: SURGERY

## 2025-02-05 PROCEDURE — 3078F DIAST BP <80 MM HG: CPT | Mod: CPTII,,, | Performed by: SURGERY

## 2025-02-05 PROCEDURE — 99999 PR PBB SHADOW E&M-EST. PATIENT-LVL III: CPT | Mod: PBBFAC,,, | Performed by: SURGERY

## 2025-02-05 RX ORDER — CYCLOBENZAPRINE HCL 5 MG
5 TABLET ORAL 3 TIMES DAILY PRN
Qty: 42 TABLET | Refills: 0 | Status: SHIPPED | OUTPATIENT
Start: 2025-02-05 | End: 2025-02-19

## 2025-02-05 NOTE — PROGRESS NOTES
Plastic Surgery Clinic Postop Visit  Deanna Mcclain is a 59 y.o. year old female who presents to the [facility]astic Surgery Clinic for follow up visit status post bilateral implant exchange and lateral capsulorrhaphies on 1/27/2025 by Dr. Brad Zavala. Pt reports that the red spot on her left breast is back. She has been taking her antibiotics. She also states that she is still smoking cigarettes and a peppermint oil vape.   Denies fever, chills, nausea, vomiting.    REVIEW OF SYSTEMS:   Negative unless otherwise stated above in HPI    PHYSICAL EXAM:  Vitals:    02/05/25 0836   BP: 122/71   Pulse: (!) 56     WD WN NAD  VSS  Normal resp effort  Bilateral breast: incision with tape in place. Red spot on her left breast similar to before. No drainage. No fluid collection palpated.     ASSESSMENT/PLAN:  59 y.o. female status post bilateral implant exchange + capsulorrhaphies.   - Pt was seen and evaluated by myself and Dr. Brad Zavala.  - Pt continues to have a red spot on her left breast that comes and goes. She is currently on antibiotics.   - Pt encouraged to stop smoking.   - Rx muscle relaxant.   - Return to clinic in 2 weeks. Staff to schedule.    All questions were answered. The patient was advised to call the clinic with any questions or concerns prior to their next visit.     Margaret Patel PA-C  Plastic and Reconstructive Surgery  [phone]

## 2025-02-12 ENCOUNTER — OFFICE VISIT (OUTPATIENT)
Dept: PLASTIC SURGERY | Facility: CLINIC | Age: 60
End: 2025-02-12
Payer: MEDICAID

## 2025-02-12 ENCOUNTER — PATIENT MESSAGE (OUTPATIENT)
Dept: PLASTIC SURGERY | Facility: CLINIC | Age: 60
End: 2025-02-12

## 2025-02-12 VITALS
HEART RATE: 78 BPM | SYSTOLIC BLOOD PRESSURE: 125 MMHG | HEIGHT: 59 IN | BODY MASS INDEX: 36.26 KG/M2 | WEIGHT: 179.88 LBS | DIASTOLIC BLOOD PRESSURE: 81 MMHG

## 2025-02-12 DIAGNOSIS — Z09 SURGERY FOLLOW-UP EXAMINATION: ICD-10-CM

## 2025-02-12 DIAGNOSIS — Z98.890 S/P BREAST RECONSTRUCTION: Primary | ICD-10-CM

## 2025-02-12 PROCEDURE — 99213 OFFICE O/P EST LOW 20 MIN: CPT | Mod: PBBFAC | Performed by: SURGERY

## 2025-02-12 RX ORDER — OXYCODONE AND ACETAMINOPHEN 5; 325 MG/1; MG/1
1 TABLET ORAL EVERY 6 HOURS PRN
Qty: 14 TABLET | Refills: 0 | Status: SHIPPED | OUTPATIENT
Start: 2025-02-12

## 2025-02-18 NOTE — PROGRESS NOTES
Patient presents to Plastic breast reconstruction with an implant exchange and capsulorrhaphy.  She has done very very well.  She is complaining of some pain.  This should resolve with time.  I discussed with the patient that we did have to sewed down to the chest wall and that is why she has pain.  She has more pain on the right side than the left.  We will continue to monitor.

## 2025-03-19 ENCOUNTER — OFFICE VISIT (OUTPATIENT)
Dept: PLASTIC SURGERY | Facility: CLINIC | Age: 60
End: 2025-03-19
Payer: MEDICAID

## 2025-03-19 DIAGNOSIS — Z09 SURGERY FOLLOW-UP EXAMINATION: Primary | ICD-10-CM

## 2025-03-19 PROCEDURE — 99213 OFFICE O/P EST LOW 20 MIN: CPT | Mod: PBBFAC | Performed by: SURGERY

## 2025-03-19 PROCEDURE — 99999 PR PBB SHADOW E&M-EST. PATIENT-LVL III: CPT | Mod: PBBFAC,,, | Performed by: SURGERY

## 2025-03-19 PROCEDURE — 1159F MED LIST DOCD IN RCRD: CPT | Mod: CPTII,,, | Performed by: SURGERY

## 2025-03-19 PROCEDURE — 99024 POSTOP FOLLOW-UP VISIT: CPT | Mod: ,,, | Performed by: SURGERY

## 2025-03-19 PROCEDURE — 4010F ACE/ARB THERAPY RXD/TAKEN: CPT | Mod: CPTII,,, | Performed by: SURGERY

## 2025-03-19 NOTE — PROGRESS NOTES
Patient presents to Plastic breast reconstruction with an implant exchange and capsulorrhaphy.  She reports that her implant on the right flips from time to time. On physical exam, the skin of the right breast is extremely thin. I have urged the pt to wear a support bra with a pad to help support her implant. We will take the pt to the minor procedure room to revise the right breast.

## 2025-04-01 ENCOUNTER — PATIENT MESSAGE (OUTPATIENT)
Dept: PLASTIC SURGERY | Facility: CLINIC | Age: 60
End: 2025-04-01
Payer: MEDICAID

## 2025-04-02 ENCOUNTER — PROCEDURE VISIT (OUTPATIENT)
Dept: PLASTIC SURGERY | Facility: CLINIC | Age: 60
End: 2025-04-02
Payer: MEDICAID

## 2025-04-02 DIAGNOSIS — Z09 SURGERY FOLLOW-UP EXAMINATION: Primary | ICD-10-CM

## 2025-04-02 PROCEDURE — 99024 POSTOP FOLLOW-UP VISIT: CPT | Mod: ,,, | Performed by: SURGERY

## 2025-04-08 NOTE — PROCEDURES
Patient was evaluated prior to her procedure.  The plan was to excise a small amount of thinned skin inferiorly on her right breast.  I did notice today that her implant was flipped.  I did have to manipulate this quite a bit to flip the implant which was successfully performed.  After having a long discussion with the patient I think it is best to hold off on any type of surgery.  We will see her back in about 6 weeks.

## 2025-06-03 ENCOUNTER — PATIENT MESSAGE (OUTPATIENT)
Dept: PLASTIC SURGERY | Facility: CLINIC | Age: 60
End: 2025-06-03
Payer: MEDICAID

## 2025-07-09 ENCOUNTER — PATIENT MESSAGE (OUTPATIENT)
Dept: PLASTIC SURGERY | Facility: CLINIC | Age: 60
End: 2025-07-09
Payer: MEDICAID

## 2025-08-08 ENCOUNTER — PATIENT MESSAGE (OUTPATIENT)
Dept: PLASTIC SURGERY | Facility: CLINIC | Age: 60
End: 2025-08-08
Payer: MEDICAID

## (undated) DEVICE — COMPRESSION BRA STYLE 7 48

## (undated) DEVICE — BLADE SURG #15 CARBON STEEL

## (undated) DEVICE — SUCTION COAGULATOR 10FR 6IN

## (undated) DEVICE — BANDAGE ROLL COTTN 4.5INX4.1YD

## (undated) DEVICE — DRAPE HALF SURGICAL 40X58IN

## (undated) DEVICE — SYS PRINEO SKIN CLOSURE

## (undated) DEVICE — DRAIN CHANNEL ROUND 15FR

## (undated) DEVICE — TRAY SKIN SCRUB WET PREMIUM

## (undated) DEVICE — RETRACTOR RADIALUX LIGHTED

## (undated) DEVICE — TIP YANKAUERS BULB NO VENT

## (undated) DEVICE — SIZER BREAST NATRELLE 650ML
Type: IMPLANTABLE DEVICE | Site: BREAST | Status: NON-FUNCTIONAL
Removed: 2024-06-13

## (undated) DEVICE — NDL SPINAL SPINOCAN 22GX3.5

## (undated) DEVICE — STAPLER SKIN ROTATING HEAD

## (undated) DEVICE — ELECTRODE REM PLYHSV RETURN 9

## (undated) DEVICE — SUT 2-0 VICRYL / CT-1

## (undated) DEVICE — GAUZE CNFRM STRL 3INX4.1YD

## (undated) DEVICE — MANIFOLD 4 PORT

## (undated) DEVICE — SOL VASHE INSTILLATION WND 16

## (undated) DEVICE — SPONGE LAP 18X18 PREWASHED

## (undated) DEVICE — SUT MCRYL PLUS 4-0 PS2 27IN

## (undated) DEVICE — SUT CTD VICRYL 3-0 PS-1

## (undated) DEVICE — BOVIE SUCTION

## (undated) DEVICE — MARKER FN REG DUAL UTIL RULER

## (undated) DEVICE — EVACUATOR WOUND BULB 100CC

## (undated) DEVICE — PAD ABDOMINAL STERILE 8X10IN

## (undated) DEVICE — SUT MONOCRYL 3-0 PS-2 UND

## (undated) DEVICE — SUT ETHILON 4-0 PC5 18IN

## (undated) DEVICE — GOWN SURGICAL X-LARGE

## (undated) DEVICE — REMOVER STAPLE SKIN STERILE

## (undated) DEVICE — BOWL STERILE LARGE 32OZ

## (undated) DEVICE — GOWN AERO CHROME W/ TOWEL XL

## (undated) DEVICE — NDL HYPO REG 25G X 1 1/2

## (undated) DEVICE — SYR 30CC LUER LOCK

## (undated) DEVICE — BLADE SURG CARBON STEEL #10

## (undated) DEVICE — PACK UNIVERSAL SPLIT II

## (undated) DEVICE — DRESSING TEGADERM CHG 3.5X4.5

## (undated) DEVICE — TRAY CATH FOL SIL URIMTR 16FR

## (undated) DEVICE — HYDROGEN PEROXIDE HDX10 3% 4OZ

## (undated) DEVICE — ELECTRODE EXTENDED BLADE

## (undated) DEVICE — DRESSING XEROFORM NONADH 1X8IN

## (undated) DEVICE — APPLICATOR CHLORAPREP ORN 26ML

## (undated) DEVICE — SUT SILK 2-0 PS 18IN BLACK

## (undated) DEVICE — NATRELLE INSPIRA RESTER SZR STY MSZ-X700 EXTRA FULL
Type: IMPLANTABLE DEVICE | Site: BREAST | Status: NON-FUNCTIONAL
Brand: NATRELLE INSPIRA RESTERILIZABLE SIZERS
Removed: 2025-01-27

## (undated) DEVICE — TOWEL OR DISP STRL BLUE 4/PK

## (undated) DEVICE — SOL 9P NACL IRR PIC IL

## (undated) DEVICE — TRAY MINOR GEN SURG OMC

## (undated) DEVICE — Device

## (undated) DEVICE — SUT CTD VICRYL 4-0 UND PS-1

## (undated) DEVICE — ELECTRODE BLADE INSULATED 1 IN

## (undated) DEVICE — PENCIL ROCKER SWITCH 10FT CORD

## (undated) DEVICE — APPLIER CLIP LIAGCLIP 9.375IN